# Patient Record
Sex: MALE | Race: BLACK OR AFRICAN AMERICAN
[De-identification: names, ages, dates, MRNs, and addresses within clinical notes are randomized per-mention and may not be internally consistent; named-entity substitution may affect disease eponyms.]

---

## 2017-01-22 ENCOUNTER — HOSPITAL ENCOUNTER (INPATIENT)
Dept: HOSPITAL 17 - NEPA | Age: 67
LOS: 3 days | Discharge: HOME | DRG: 189 | End: 2017-01-25
Attending: HOSPITALIST | Admitting: HOSPITALIST
Payer: COMMERCIAL

## 2017-01-22 VITALS
SYSTOLIC BLOOD PRESSURE: 163 MMHG | DIASTOLIC BLOOD PRESSURE: 88 MMHG | TEMPERATURE: 98.3 F | RESPIRATION RATE: 16 BRPM | HEART RATE: 109 BPM | OXYGEN SATURATION: 98 %

## 2017-01-22 VITALS
DIASTOLIC BLOOD PRESSURE: 89 MMHG | SYSTOLIC BLOOD PRESSURE: 150 MMHG | HEART RATE: 113 BPM | OXYGEN SATURATION: 95 % | RESPIRATION RATE: 21 BRPM

## 2017-01-22 VITALS
DIASTOLIC BLOOD PRESSURE: 83 MMHG | OXYGEN SATURATION: 94 % | TEMPERATURE: 99.4 F | RESPIRATION RATE: 20 BRPM | SYSTOLIC BLOOD PRESSURE: 153 MMHG | HEART RATE: 102 BPM

## 2017-01-22 VITALS — WEIGHT: 160.34 LBS | BODY MASS INDEX: 23.75 KG/M2 | HEIGHT: 69 IN

## 2017-01-22 VITALS
SYSTOLIC BLOOD PRESSURE: 166 MMHG | OXYGEN SATURATION: 94 % | RESPIRATION RATE: 24 BRPM | HEART RATE: 107 BPM | DIASTOLIC BLOOD PRESSURE: 91 MMHG

## 2017-01-22 VITALS
TEMPERATURE: 99.1 F | DIASTOLIC BLOOD PRESSURE: 91 MMHG | OXYGEN SATURATION: 94 % | RESPIRATION RATE: 24 BRPM | HEART RATE: 107 BPM | SYSTOLIC BLOOD PRESSURE: 166 MMHG

## 2017-01-22 VITALS
OXYGEN SATURATION: 94 % | HEART RATE: 107 BPM | DIASTOLIC BLOOD PRESSURE: 84 MMHG | SYSTOLIC BLOOD PRESSURE: 142 MMHG | RESPIRATION RATE: 20 BRPM

## 2017-01-22 VITALS — HEART RATE: 109 BPM

## 2017-01-22 VITALS — OXYGEN SATURATION: 95 %

## 2017-01-22 VITALS — OXYGEN SATURATION: 98 %

## 2017-01-22 DIAGNOSIS — R60.0: ICD-10-CM

## 2017-01-22 DIAGNOSIS — R00.0: ICD-10-CM

## 2017-01-22 DIAGNOSIS — J96.21: Primary | ICD-10-CM

## 2017-01-22 DIAGNOSIS — J44.1: ICD-10-CM

## 2017-01-22 DIAGNOSIS — Z99.81: ICD-10-CM

## 2017-01-22 DIAGNOSIS — J32.9: ICD-10-CM

## 2017-01-22 DIAGNOSIS — Z79.84: ICD-10-CM

## 2017-01-22 DIAGNOSIS — Z23: ICD-10-CM

## 2017-01-22 DIAGNOSIS — Z88.1: ICD-10-CM

## 2017-01-22 DIAGNOSIS — Z85.46: ICD-10-CM

## 2017-01-22 DIAGNOSIS — T38.0X5A: ICD-10-CM

## 2017-01-22 DIAGNOSIS — I10: ICD-10-CM

## 2017-01-22 DIAGNOSIS — E11.65: ICD-10-CM

## 2017-01-22 LAB
ALP SERPL-CCNC: 79 U/L (ref 45–117)
ALT SERPL-CCNC: 19 U/L (ref 12–78)
ANION GAP SERPL CALC-SCNC: 13 MEQ/L (ref 5–15)
APTT BLD: 23 SEC (ref 24.3–30.1)
AST SERPL-CCNC: 8 U/L (ref 15–37)
BASOPHILS # BLD AUTO: 0.1 TH/MM3 (ref 0–0.2)
BASOPHILS NFR BLD: 0.7 % (ref 0–2)
BILIRUB SERPL-MCNC: 0.8 MG/DL (ref 0.2–1)
BUN SERPL-MCNC: 23 MG/DL (ref 7–18)
CHLORIDE SERPL-SCNC: 100 MEQ/L (ref 98–107)
CK MB SERPL-MCNC: 2.6 NG/ML (ref 0.5–3.6)
CK SERPL-CCNC: 173 U/L (ref 39–308)
COLOR UR: (no result)
COMMENT (UR): (no result)
CULTURE IF INDICATED: (no result)
EOSINOPHIL # BLD: 2 TH/MM3 (ref 0–0.4)
EOSINOPHIL NFR BLD: 14.7 % (ref 0–4)
ERYTHROCYTE [DISTWIDTH] IN BLOOD BY AUTOMATED COUNT: 15.1 % (ref 11.6–17.2)
GFR SERPLBLD BASED ON 1.73 SQ M-ARVRAT: 80 ML/MIN (ref 89–?)
GLUCOSE UR STRIP-MCNC: (no result) MG/DL
HCO3 BLD-SCNC: 27.4 MEQ/L (ref 21–32)
HCT VFR BLD CALC: 43.7 % (ref 39–51)
HEMO FLAGS: (no result)
HGB UR QL STRIP: (no result)
INR PPP: 1 RATIO
KETONES UR STRIP-MCNC: (no result) MG/DL
LYMPHOCYTES # BLD AUTO: 0.7 TH/MM3 (ref 1–4.8)
LYMPHOCYTES NFR BLD AUTO: 5.4 % (ref 9–44)
MAGNESIUM SERPL-MCNC: 1.9 MG/DL (ref 1.5–2.5)
MCH RBC QN AUTO: 27.7 PG (ref 27–34)
MCHC RBC AUTO-ENTMCNC: 33.2 % (ref 32–36)
MCV RBC AUTO: 83.4 FL (ref 80–100)
MONOCYTES NFR BLD: 6.6 % (ref 0–8)
MUCOUS THREADS #/AREA URNS LPF: (no result) /LPF
NEUTROPHILS # BLD AUTO: 10 TH/MM3 (ref 1.8–7.7)
NEUTROPHILS NFR BLD AUTO: 72.6 % (ref 16–70)
NITRITE UR QL STRIP: (no result)
PLATELET # BLD: 197 TH/MM3 (ref 150–450)
POTASSIUM SERPL-SCNC: 3.5 MEQ/L (ref 3.5–5.1)
PROTHROMBIN TIME: 10.5 SEC (ref 9.8–11.6)
RBC # BLD AUTO: 5.24 MIL/MM3 (ref 4.5–5.9)
SODIUM SERPL-SCNC: 140 MEQ/L (ref 136–145)
SP GR UR STRIP: 1.01 (ref 1–1.03)
WBC # BLD AUTO: 13.8 TH/MM3 (ref 4–11)

## 2017-01-22 PROCEDURE — 71275 CT ANGIOGRAPHY CHEST: CPT

## 2017-01-22 PROCEDURE — 90732 PPSV23 VACC 2 YRS+ SUBQ/IM: CPT

## 2017-01-22 PROCEDURE — 96365 THER/PROPH/DIAG IV INF INIT: CPT

## 2017-01-22 PROCEDURE — 81001 URINALYSIS AUTO W/SCOPE: CPT

## 2017-01-22 PROCEDURE — 83880 ASSAY OF NATRIURETIC PEPTIDE: CPT

## 2017-01-22 PROCEDURE — 85730 THROMBOPLASTIN TIME PARTIAL: CPT

## 2017-01-22 PROCEDURE — 85025 COMPLETE CBC W/AUTO DIFF WBC: CPT

## 2017-01-22 PROCEDURE — 87040 BLOOD CULTURE FOR BACTERIA: CPT

## 2017-01-22 PROCEDURE — 71010: CPT

## 2017-01-22 PROCEDURE — 82552 ASSAY OF CPK IN BLOOD: CPT

## 2017-01-22 PROCEDURE — 94640 AIRWAY INHALATION TREATMENT: CPT

## 2017-01-22 PROCEDURE — 82948 REAGENT STRIP/BLOOD GLUCOSE: CPT

## 2017-01-22 PROCEDURE — 90686 IIV4 VACC NO PRSV 0.5 ML IM: CPT

## 2017-01-22 PROCEDURE — 85610 PROTHROMBIN TIME: CPT

## 2017-01-22 PROCEDURE — 82550 ASSAY OF CK (CPK): CPT

## 2017-01-22 PROCEDURE — 80053 COMPREHEN METABOLIC PANEL: CPT

## 2017-01-22 PROCEDURE — 93005 ELECTROCARDIOGRAM TRACING: CPT

## 2017-01-22 PROCEDURE — 80048 BASIC METABOLIC PNL TOTAL CA: CPT

## 2017-01-22 PROCEDURE — 94664 DEMO&/EVAL PT USE INHALER: CPT

## 2017-01-22 PROCEDURE — 84484 ASSAY OF TROPONIN QUANT: CPT

## 2017-01-22 PROCEDURE — 83735 ASSAY OF MAGNESIUM: CPT

## 2017-01-22 PROCEDURE — 87804 INFLUENZA ASSAY W/OPTIC: CPT

## 2017-01-22 RX ADMIN — ATORVASTATIN CALCIUM SCH MG: 10 TABLET, FILM COATED ORAL at 21:42

## 2017-01-22 RX ADMIN — BUDESONIDE AND FORMOTEROL FUMARATE DIHYDRATE SCH PUFF: 160; 4.5 AEROSOL RESPIRATORY (INHALATION) at 21:41

## 2017-01-22 RX ADMIN — INSULIN ASPART SCH: 100 INJECTION, SOLUTION INTRAVENOUS; SUBCUTANEOUS at 21:00

## 2017-01-22 RX ADMIN — IPRATROPIUM BROMIDE AND ALBUTEROL SULFATE SCH AMPULE: .5; 3 SOLUTION RESPIRATORY (INHALATION) at 18:00

## 2017-01-22 RX ADMIN — IPRATROPIUM BROMIDE AND ALBUTEROL SULFATE SCH AMPULE: .5; 3 SOLUTION RESPIRATORY (INHALATION) at 20:11

## 2017-01-22 RX ADMIN — IPRATROPIUM BROMIDE AND ALBUTEROL SULFATE SCH AMPULE: .5; 3 SOLUTION RESPIRATORY (INHALATION) at 20:10

## 2017-01-22 RX ADMIN — SODIUM CHLORIDE, PRESERVATIVE FREE SCH ML: 5 INJECTION INTRAVENOUS at 21:05

## 2017-01-22 RX ADMIN — IPRATROPIUM BROMIDE AND ALBUTEROL SULFATE SCH AMPULE: .5; 3 SOLUTION RESPIRATORY (INHALATION) at 17:45

## 2017-01-22 RX ADMIN — GUAIFENESIN SCH MG: 600 TABLET, EXTENDED RELEASE ORAL at 21:19

## 2017-01-22 NOTE — HHI.HP
__________________________________________________





HPI


Service


[facility] [place]


Primary Care Physician


Sadie Nuñez DO


Admission Diagnosis


COPD exacerbation, hypoxia


Diagnoses:  


(1) COPD (chronic obstructive pulmonary disease)


Diagnosis:  Principal





(2) Hypoxia


Diagnosis:  Principal





(3) HTN (hypertension)


Diagnosis:  Principal





(4) DM (diabetes mellitus)


Diagnosis:  Principal





Travel History


International Travel<30 Days:  No


Contact w/Intl Traveler <30 Da:  No


Traveled to Known Affected Are:  No


History of Present Illness


This is a 66-year-old male with a PMH of HTN, DM, COPD, O2 Dependent and 

Prostate CA who was referred to the ER from Urgent Care for hypoxia with O2 sat 

88% on RA.  Pt states he's had ongoing SOB and productive cough w/ white-

colored sputum x3 days, started on Z-pack by PCP, taking meds as prescribed w/ 

minimal relief.  Presented to Urgent Care today for ongoing symptoms and found 

to have hypoxia.  Denies fever or chills, no chest pain.  On arrival, /91

, , O2 sat 94% on RA, Temp 99.1.  WBC 13.8.  Chemistry essentially 

unremarkable.  UA negative.  CXR with no acute findings.  CTA Pulm pending.  S/

p Levaquin and DuoNeb in ER w/ some improvement.





Review of Systems


Other


ROS: 14 point review of systems otherwise negative.





Past Family Social History


Past Medical History


PMH:  HTN, DM, COPD, O2 Dependent and Prostate CA


Past Surgical History


PAST SURGICAL HISTORY:  Sinus Surgery


Allergies:  


Coded Allergies:  


     Clindamycin (Verified  Allergy, Mild, RASH, 1/22/17)


     Tobramycin (Verified  Allergy, Mild, RASH, 1/22/17)


Family History


PAST FAMILY HISTORY:  Reviewed.  No h/o DM or CAD


Social History


PAST SOCIAL HISTORY:  Negative for alcohol, tobacco or drugs.





Physical Exam


Vital Signs





 Vital Signs








  Date Time  Temp Pulse Resp B/P Pulse Ox O2 Delivery O2 Flow Rate FiO2


 


1/22/17 18:09     95 Nasal Cannula 2.00 


 


1/22/17 17:45     98 Nasal Cannula 2 


 


1/22/17 17:45  102 24  94 Room Air  


 


1/22/17 17:45     98 Nasal Cannula 2 


 


1/22/17 17:42 99.1 107 24 166/91 94 Room Air  


 


1/22/17 17:40  107 24 166/91 94 Room Air  


 


1/22/17 17:30 98.3 109 16 163/88 98   








Physical Exam


PE:


GENERAL: Pleasant middle-aged black male in no acute distress.


HEENT: PERRLA, EOMI. No scleral icterus or conjunctival pallor. No lid lag or 

facial droop.  


CARDIOVASCULAR: Regular rate and rhythm.  No obvious murmurs to auscultation. 

No chest tenderness to palpation. 


RESPIRATORY: No obvious rhonchi. Occasional wheezing. Breath sounds equal 

bilaterally. 


GASTROINTESTINAL: Abdomen soft, non-tender, nondistended. BS normal. 


MUSCULOSKELETAL: Extremities without clubbing, cyanosis, or edema. No obvious 

deformities. 


NEUROLOGICAL: Awake, alert and oriented x4. No focal neurologic deficits. 

Moving both upper and lower extremities spontaneously.


Laboratory





Laboratory Tests








Test 1/22/17 1/22/17





 18:00 18:45


 


White Blood Count 13.8  


 


Red Blood Count 5.24  


 


Hemoglobin 14.5  


 


Hematocrit 43.7  


 


Mean Corpuscular Volume 83.4  


 


Mean Corpuscular Hemoglobin 27.7  


 


Mean Corpuscular Hemoglobin 33.2  





Concent  


 


Red Cell Distribution Width 15.1  


 


Platelet Count 197  


 


Mean Platelet Volume 8.7  


 


Neutrophils (%) (Auto) 72.6  


 


Lymphocytes (%) (Auto) 5.4  


 


Monocytes (%) (Auto) 6.6  


 


Eosinophils (%) (Auto) 14.7  


 


Basophils (%) (Auto) 0.7  


 


Neutrophils # (Auto) 10.0  


 


Lymphocytes # (Auto) 0.7  


 


Monocytes # (Auto) 0.9  


 


Eosinophils # (Auto) 2.0  


 


Basophils # (Auto) 0.1  


 


CBC Comment DIFF FINAL  


 


Differential Comment   


 


Prothrombin Time 10.5  


 


Prothromb Time International 1.0  





Ratio  


 


Activated Partial 23.0  





Thromboplast Time  


 


Sodium Level 140  


 


Potassium Level 3.5  


 


Chloride Level 100  


 


Carbon Dioxide Level 27.4  


 


Anion Gap 13  


 


Blood Urea Nitrogen 23  


 


Creatinine 1.11  


 


Estimat Glomerular Filtration 80  





Rate  


 


Random Glucose 122  


 


Calcium Level 9.0  


 


Magnesium Level 1.9  


 


Total Bilirubin 0.8  


 


Aspartate Amino Transf 8  





(AST/SGOT)  


 


Alanine Aminotransferase 19  





(ALT/SGPT)  


 


Alkaline Phosphatase 79  


 


Total Creatine Kinase 173  


 


Creatine Kinase MB 2.6  


 


Troponin I LESS THAN 0.02  


 


B-Type Natriuretic Peptide 24  


 


Total Protein 7.0  


 


Albumin 3.1  


 


Urine Color  LIGHT-YELLOW 


 


Urine Turbidity  CLEAR 


 


Urine pH  6.0 


 


Urine Specific Gravity  1.009 


 


Urine Protein  NEG 


 


Urine Glucose (UA)  NEG 


 


Urine Ketones  NEG 


 


Urine Occult Blood  TRACE 


 


Urine Nitrite  NEG 


 


Urine Bilirubin  NEG 


 


Urine Urobilinogen  LESS THAN 2.0 


 


Urine Leukocyte Esterase  NEG 


 


Urine RBC  1 


 


Urine WBC  1 


 


Urine Mucus  FEW 


 


Microscopic Urinalysis Comment  CULT NOT





  INDICATED














 Date/Time Procedure Status





Source Growth 


 


 1/22/17 18:05 Influenza Types A,B Antigen (PAMELA) - Final Complete





Nasal Washing NEGATIVE FOR FLU A AND B ANTIGEN.... 


 


 1/22/17 18:05 Aerobic Blood Culture Received





Blood Line Pending 





 1/22/17 18:05 Anaerobic Blood Culture Received





Blood Line Pending 








Result Diagram:  


1/22/17 1800                                                                   

             1/22/17 1800








Assessment and Plan


Problem List:  


(1) COPD (chronic obstructive pulmonary disease)


ICD Code:  J44.9


Status:  Acute


(2) Hypoxia


ICD Code:  R09.02


Status:  Acute


(3) HTN (hypertension)


ICD Code:  I10


Status:  Acute


(4) DM (diabetes mellitus)


ICD Code:  E11.9


Status:  Acute


Assessment and Plan


A/P: 


1.  COPD:  Chronic Respiratory Failure w/ Acute Exacerbation.  Referred to ER 

from Urgent Care secondary to hypoxia w/ O2 sat 88% on RA.  S/p Z-pack x3 days 

by PCP w/ no improvement.  +wheezing, +respiratory distress on arrival.  Solu-

Medrol, DuoNeb, Symbicort, Mucinex.  S/p Levaquin in ER, will continue w/ IV 

Abx for possible atypical PNA.  CXR w/ no acute findings, images reviewed by 

me.  CTA Pulm ordered in ER, currently pending.  Will follow up results. 


2.  DM:  Sliding scale w/ Accu-Cheks.  On Metformin and Nateglinide at home. 


3.  HTN:  's, likely compounded by respiratory distress.  Monitor BP.  

Resume home medications. 


4.  DVT Prophylaxis:  SCD/Teds. 


5.  Social work for d/c planning as needed. 


6.  Case discussed w/ ER physician at length.





Physician Certification


2 Midnight Certification Type:  Admission for Inpatient Services


Order for Inpatient Services


The services are ordered in accordance with Medicare regulations or non-

Medicare payer requirements, as applicable.  In the case of services not 

specified as inpatient-only, they are appropriately provided as inpatient 

services in accordance with the 2-midnight benchmark.


Estimated LOS (days):  2


 days is the estimated time the patient will need to remain in the hospital, 

assuming treatment plan goals are met and no additional complications.


Post-Hospital Plan:  Home








Valerie Hendricks MD Jan 22, 2017 20:02

## 2017-01-22 NOTE — PD
HPI


Chief Complaint:  Respiratory Symptoms


Time Seen by Provider:  17:45


Travel History


International Travel<30 days:  No


Contact w/Intl Traveler<30days:  No


Traveled to known affect area:  No





History of Present Illness


HPI


66-year-old male with history of COPD and chronic sinusitis presents the ED for 

evaluation of 3 day history of increased shortness of breath.  Patient endorses 

a tightness in the central area of the chest has been constant over the same 

period time. Denies radiation of the pain, diaphoresis, nausea or vomiting.  

Endorses chronic cough, occasionally productive of white sputum.  He also 

endorses chronic rhinorrhea with occasional bright green output.  He saw his 

primary care provider, Dr. Nuñez who prescribed azithromycin, currently on day 

3.  Went to [facility] today where his O2 sats were 88% on room air.  

Subsequently referred here.





PFSH


Past Medical History


Cancer:  Yes (PROSTATE)


Cardiovascular Problems:  Yes


High Cholesterol:  Yes


COPD:  Yes (EMPHYSEMA COPD)


Cerebrovascular Accident:  No


Diabetes:  Yes (TYPE 2 )


Diminished Hearing:  No


Genitourinary:  Yes


Hypertension:  Yes


Immune Disorder:  No


Musculoskeletal:  Yes


Neurologic:  No


Psychiatric:  No


Reproductive:  No


Respiratory:  Yes (COPD)


Immunizations Current:  Yes


Migraines:  No





Past Surgical History


Other Surgery:  Yes (4 SINUS SURGERIES - 2000 - 2007)





Social History


Alcohol Use:  No


Tobacco Use:  No


Substance Use:  No





Allergies-Medications


(Allergen,Severity, Reaction):  


Coded Allergies:  


     Clindamycin (Verified  Allergy, Mild, RASH, 1/22/17)


     Tobramycin (Verified  Allergy, Mild, RASH, 1/22/17)


Reported Meds & Prescriptions





Reported Meds & Active Scripts


Active


Reported


Furosemide 20 Mg Tab 20 Mg PO DAILY


Amoxicillin 500 Mg Cap 500 Mg PO BID


Caltrate 600+D (Calcium Carbonate-Cholecalciferol) 600-800 Mg-Unit Tab 1 Tab PO 

BID


Spiriva Handihaler (Tiotropium Inh) 18 Mcg Cap 18 Mcg INH DAILY


     1 capsule = 18 mcg


Atorvastatin (Atorvastatin Calcium) 10 Mg Tab 10 Mg PO HS


Metformin ER (Metformin HCl) 500 Mg Fredi 500 Mg PO TID


     With evening meal


Nateglinide 120 Mg Tab 120 Mg PO TIDAC


Albuterol Neb (Albuterol Sulfate) 1.25 Mg/3 Ml Neb 1.25 Mg NEB TID NEB PRN


Prednisone 10 Mg Tab 10 Mg PO DAILY








Review of Systems


Except as stated in HPI:  all other systems reviewed are Neg





Physical Exam


Narrative


GENERAL: Well-nourished, well-developed thin black male in no acute distress.


SKIN: Warm and dry.


HEAD: Normocephalic.


EYES: No scleral icterus. No injection or drainage. 


NECK: Supple, trachea midline. No JVD or lymphadenopathy.


CARDIOVASCULAR: Regular rate and rhythm without murmurs, gallops, or rubs.  2+ 

DP and radial pulses bilaterally.


RESPIRATORY: Breath sounds equal bilaterally.  Diffuse wheezes in all lung 

fields.  ++ accessory muscle use.  Speaking in short sentences.


GASTROINTESTINAL: Abdomen soft, non-tender, nondistended.  Active bowel sounds.


MUSCULOSKELETAL: No cyanosis.  2+ pitting edema in bilateral lower extremities, 

left greater than right.  Homans sign negative bilaterally.


BACK: Nontender without obvious deformity. No CVA tenderness.





Data


Data


Last Documented VS





Vital Signs








  Date Time  Temp Pulse Resp B/P Pulse Ox O2 Delivery O2 Flow Rate FiO2


 


1/22/17 18:09     95 Nasal Cannula 2.00 


 


1/22/17 17:45  102 24     


 


1/22/17 17:42 99.1   166/91    








Orders





 Complete Blood Count With Diff (1/22/17 17:43)


Comprehensive Metabolic Panel (1/22/17 17:43)


B-Type Natriuretic Peptide (1/22/17 17:43)


Act Partial Throm Time (Ptt) (1/22/17 17:43)


Prothrombin Time / Inr (Pt) (1/22/17 17:43)


Magnesium (Mg) (1/22/17 17:43)


Ckmb (Isoenzyme) Profile (1/22/17 17:43)


Troponin I (1/22/17 17:43)


Urinalysis - C+S If Indicated (1/22/17 17:43)


Influenzae A/B Antigen (1/22/17 17:43)


Iv Access Insert/Monitor (1/22/17 17:43)


Electrocardiogram (1/22/17 17:43)


Ecg Monitoring (1/22/17 17:43)


Oximetry (1/22/17 17:43)


Oxygen Administration (1/22/17 17:43)


Chest, Single Ap (1/22/17 17:43)


Sodium Chloride 0.9% Flush (Ns Flush) (1/22/17 17:45)


Albuterol-Ipratropium Neb (Duoneb Neb) (1/22/17 17:45)


Blood Glucose (1/22/17 17:43)


Blood Culture (1/22/17 17:45)


Levofloxacin 750 Mg Premix Inj (Levaquin (1/22/17 18:00)


CKMB (1/22/17 18:00)


CKMB% (1/22/17 18:00)


Ct Pulmonary Angiogram (1/22/17 19:40)


Admit Order (Ed Use Only) (1/22/17 19:51)





Labs





 Laboratory Tests








Test 1/22/17 1/22/17





 18:00 18:45


 


White Blood Count 13.8 TH/MM3 


 


Red Blood Count 5.24 MIL/MM3 


 


Hemoglobin 14.5 GM/DL 


 


Hematocrit 43.7 % 


 


Mean Corpuscular Volume 83.4 FL 


 


Mean Corpuscular Hemoglobin 27.7 PG 


 


Mean Corpuscular Hemoglobin 33.2 % 





Concent  


 


Red Cell Distribution Width 15.1 % 


 


Platelet Count 197 TH/MM3 


 


Mean Platelet Volume 8.7 FL 


 


Neutrophils (%) (Auto) 72.6 % 


 


Lymphocytes (%) (Auto) 5.4 % 


 


Monocytes (%) (Auto) 6.6 % 


 


Eosinophils (%) (Auto) 14.7 % 


 


Basophils (%) (Auto) 0.7 % 


 


Neutrophils # (Auto) 10.0 TH/MM3 


 


Lymphocytes # (Auto) 0.7 TH/MM3 


 


Monocytes # (Auto) 0.9 TH/MM3 


 


Eosinophils # (Auto) 2.0 TH/MM3 


 


Basophils # (Auto) 0.1 TH/MM3 


 


CBC Comment DIFF FINAL  


 


Differential Comment   


 


Prothrombin Time 10.5 SEC 


 


Prothromb Time International 1.0 RATIO 





Ratio  


 


Activated Partial 23.0 SEC 





Thromboplast Time  


 


Sodium Level 140 MEQ/L 


 


Potassium Level 3.5 MEQ/L 


 


Chloride Level 100 MEQ/L 


 


Carbon Dioxide Level 27.4 MEQ/L 


 


Anion Gap 13 MEQ/L 


 


Blood Urea Nitrogen 23 MG/DL 


 


Creatinine 1.11 MG/DL 


 


Estimat Glomerular Filtration 80 ML/MIN 





Rate  


 


Random Glucose 122 MG/DL 


 


Calcium Level 9.0 MG/DL 


 


Magnesium Level 1.9 MG/DL 


 


Total Bilirubin 0.8 MG/DL 


 


Aspartate Amino Transf 8 U/L 





(AST/SGOT)  


 


Alanine Aminotransferase 19 U/L 





(ALT/SGPT)  


 


Alkaline Phosphatase 79 U/L 


 


Total Creatine Kinase 173 U/L 


 


Creatine Kinase MB 2.6 NG/ML 


 


Troponin I LESS THAN 0.02 





 NG/ML 


 


B-Type Natriuretic Peptide 24 PG/ML 


 


Total Protein 7.0 GM/DL 


 


Albumin 3.1 GM/DL 


 


Urine Color  LIGHT-YELLOW 


 


Urine Turbidity  CLEAR 


 


Urine pH  6.0 


 


Urine Specific Gravity  1.009 


 


Urine Protein  NEG mg/dL


 


Urine Glucose (UA)  NEG mg/dL


 


Urine Ketones  NEG mg/dL


 


Urine Occult Blood  TRACE 


 


Urine Nitrite  NEG 


 


Urine Bilirubin  NEG 


 


Urine Urobilinogen  LESS THAN 2.0





  MG/DL


 


Urine Leukocyte Esterase  NEG 


 


Urine RBC  1 /hpf


 


Urine WBC  1 /hpf


 


Urine Mucus  FEW /lpf


 


Microscopic Urinalysis Comment  CULT NOT





  INDICATED











MDM


Medical Decision Making


Medical Screen Exam Complete:  Yes


Emergency Medical Condition:  Yes


Interpretation(s)


Rate 102, sinus rhythm.  MT interval 126 ms.  QRS 94, .  Normal axis.  

No ischemic changes.  Reviewed by Dr. Stockton.


Differential Diagnosis


Influenza versus viral illness versus pneumonia versus COPD exacerbation versus 

PE


Narrative Course


66-year-old male with history of COPD and chronic sinusitis presents the ED for 

evaluation of 3 day history of increased shortness of breath and tightness of 

the chest. Denies radiation of the pain, diaphoresis, nausea or vomiting.  

Endorses chronic cough, occasionally productive of white sputum and chronic 

rhinorrhea with occasional bright green output.  He saw his primary care 

provider, Dr. Nuñez who prescribed azithromycin, currently on day 3.  On 2 L 

O2 as needed at home.  O2 sats 83% at [facility] today, referred here.  Vitals 

reviewed.  Sats the low 90s on room air on presentation.  Respiratory rate 24.  

Tachycardic rate 107.  Patient is to, speaking in short sentences, positive 

accessory muscle use.  Auscultation reveals diffuse wheezing in the lung 

fields.  Edema bilateral lower extremities, left greater than right.  Homans 

sign negative bilaterally.  Placed on 2 L nasal cannula, IV was established.  

Blood cultures were obtained and are pending.  Patient was administered duo 

nebs 3 and Levaquin.





CBC: WBC 13.8.  Hemoglobin 14.5.


INR 1.0


CBC: BUN 23, creatinine 1.11


Magnesium 1.9.


BNP 24.


Cardiac enzymes: Negative


Chest x-ray: No acute cardiopulmonary process.


EKG: As above, no ischemic changes.


CTA: Pending.





Recheck of the patient reveals O2 sats improved to 98% on 2 L nasal cannula.  

Subjective improvement and breathing without much improvement of the lung 

sounds.  Sats fall to 88% sans supplementary O2.  Discussed the patient, workup 

and plan of care with Dr. Stockton who is agreeable with admission.  We'll admit 

for COPD exacerbation.  Spoke with Dr. Hendricks, medical service, who agrees to 

accept this patient.  Please see her note for disposition.





Diagnosis





 Primary Impression:  


 COPD exacerbation


 Additional Impression:  


 Hypoxia








Dawna Elliott Jan 22, 2017 17:45

## 2017-01-22 NOTE — PD
Data


Data


Last Documented VS





Vital Signs








  Date Time  Temp Pulse Resp B/P Pulse Ox O2 Delivery O2 Flow Rate FiO2


 


1/22/17 19:00  107 20  94 Nasal Cannula 3 


 


1/22/17 19:00    142/84    


 


1/22/17 17:42 99.1       








Orders





 Complete Blood Count With Diff (1/22/17 17:43)


Comprehensive Metabolic Panel (1/22/17 17:43)


B-Type Natriuretic Peptide (1/22/17 17:43)


Act Partial Throm Time (Ptt) (1/22/17 17:43)


Prothrombin Time / Inr (Pt) (1/22/17 17:43)


Magnesium (Mg) (1/22/17 17:43)


Ckmb (Isoenzyme) Profile (1/22/17 17:43)


Troponin I (1/22/17 17:43)


Urinalysis - C+S If Indicated (1/22/17 17:43)


Influenzae A/B Antigen (1/22/17 17:43)


Iv Access Insert/Monitor (1/22/17 17:43)


Electrocardiogram (1/22/17 17:43)


Ecg Monitoring (1/22/17 17:43)


Oximetry (1/22/17 17:43)


Oxygen Administration (1/22/17 17:43)


Chest, Single Ap (1/22/17 17:43)


Sodium Chloride 0.9% Flush (Ns Flush) (1/22/17 17:45)


Albuterol-Ipratropium Neb (Duoneb Neb) (1/22/17 17:45)


Blood Glucose (1/22/17 17:43)


Blood Culture (1/22/17 17:45)


Levofloxacin 750 Mg Premix Inj (Levaquin (1/22/17 18:00)


CKMB (1/22/17 18:00)


CKMB% (1/22/17 18:00)


Ct Pulmonary Angiogram (1/22/17 19:40)


Admit Order (Ed Use Only) (1/22/17 19:51)





Labs





 Laboratory Tests








Test 1/22/17 1/22/17





 18:00 18:45


 


White Blood Count 13.8 TH/MM3 


 


Red Blood Count 5.24 MIL/MM3 


 


Hemoglobin 14.5 GM/DL 


 


Hematocrit 43.7 % 


 


Mean Corpuscular Volume 83.4 FL 


 


Mean Corpuscular Hemoglobin 27.7 PG 


 


Mean Corpuscular Hemoglobin 33.2 % 





Concent  


 


Red Cell Distribution Width 15.1 % 


 


Platelet Count 197 TH/MM3 


 


Mean Platelet Volume 8.7 FL 


 


Neutrophils (%) (Auto) 72.6 % 


 


Lymphocytes (%) (Auto) 5.4 % 


 


Monocytes (%) (Auto) 6.6 % 


 


Eosinophils (%) (Auto) 14.7 % 


 


Basophils (%) (Auto) 0.7 % 


 


Neutrophils # (Auto) 10.0 TH/MM3 


 


Lymphocytes # (Auto) 0.7 TH/MM3 


 


Monocytes # (Auto) 0.9 TH/MM3 


 


Eosinophils # (Auto) 2.0 TH/MM3 


 


Basophils # (Auto) 0.1 TH/MM3 


 


CBC Comment DIFF FINAL  


 


Differential Comment   


 


Prothrombin Time 10.5 SEC 


 


Prothromb Time International 1.0 RATIO 





Ratio  


 


Activated Partial 23.0 SEC 





Thromboplast Time  


 


Sodium Level 140 MEQ/L 


 


Potassium Level 3.5 MEQ/L 


 


Chloride Level 100 MEQ/L 


 


Carbon Dioxide Level 27.4 MEQ/L 


 


Anion Gap 13 MEQ/L 


 


Blood Urea Nitrogen 23 MG/DL 


 


Creatinine 1.11 MG/DL 


 


Estimat Glomerular Filtration 80 ML/MIN 





Rate  


 


Random Glucose 122 MG/DL 


 


Calcium Level 9.0 MG/DL 


 


Magnesium Level 1.9 MG/DL 


 


Total Bilirubin 0.8 MG/DL 


 


Aspartate Amino Transf 8 U/L 





(AST/SGOT)  


 


Alanine Aminotransferase 19 U/L 





(ALT/SGPT)  


 


Alkaline Phosphatase 79 U/L 


 


Total Creatine Kinase 173 U/L 


 


Creatine Kinase MB 2.6 NG/ML 


 


Troponin I LESS THAN 0.02 





 NG/ML 


 


B-Type Natriuretic Peptide 24 PG/ML 


 


Total Protein 7.0 GM/DL 


 


Albumin 3.1 GM/DL 


 


Urine Color  LIGHT-YELLOW 


 


Urine Turbidity  CLEAR 


 


Urine pH  6.0 


 


Urine Specific Gravity  1.009 


 


Urine Protein  NEG mg/dL


 


Urine Glucose (UA)  NEG mg/dL


 


Urine Ketones  NEG mg/dL


 


Urine Occult Blood  TRACE 


 


Urine Nitrite  NEG 


 


Urine Bilirubin  NEG 


 


Urine Urobilinogen  LESS THAN 2.0





  MG/DL


 


Urine Leukocyte Esterase  NEG 


 


Urine RBC  1 /hpf


 


Urine WBC  1 /hpf


 


Urine Mucus  FEW /lpf


 


Microscopic Urinalysis Comment  CULT NOT





  INDICATED











MDM


Supervised Visit with [facility]:  Yes


Narrative Course


I, Dr. Stockton, have reviewed the advance practice practitioner's documentation 

and am in agreement, met with the patient face to face, made the diagnosis, and 

the medical decision making was done by me.  





*My assessment and Findings: Patient is 66 year old male presents tachycardic 

and tachypneic.  COPD exacerbation obvious suspecting underlying PNA.  CXR 

negative.  Significantly tachycardic to high 120's allbeit after nebulizer 

treatment with albuterol.  PE is to be considered.  Scan negative.  SOB is 

improving but still would benefit from inpatient observation.


Scripts


Hydrocodone-Acetaminophen 5-325 mg Tab1 Tab PO Q6HR PRN (pain) #28 TAB


   Prov:Billy Stein MD         1/24/17 


Fluticasone Nasal Spray 50 Mcg/Act Naspr2 [place] NASAL DAILY  #1 BOTTLE


   Prov:Billy Stein MD         1/24/17








Kentrell Stockton MD Jan 22, 2017 18:28

## 2017-01-22 NOTE — RADRPT
EXAM DATE/TIME:  01/22/2017 18:05 

 

[facility] COMPARISON:     

No previous studies available for comparison.

 

                     

INDICATIONS :     

Shortness of breath.

                     

 

MEDICAL HISTORY :     

Chronic obstructive pulmonary disease.          

 

SURGICAL HISTORY :     

None.   

 

ENCOUNTER:     

Initial                                        

 

ACUITY:     

2 weeks      

 

PAIN SCORE:     

0/10

 

LOCATION:     

Bilateral chest 

 

FINDINGS:     

A single view of the chest demonstrates the lungs to be symmetrically aerated without evidence of mas
s, infiltrate or effusion.  The cardiomediastinal contours are unremarkable. There appears to be some
 calcification of the mitral valve annulus. S-shaped scoliosis of the thoracolumbar spine. Otherwise 
intact.

 

CONCLUSION:     

No acute cardiopulmonary process. Apparent calcification of the mitral valve annulus.

 

 

 

 Jose Chappell MD on January 22, 2017 at 18:35           

Board Certified Radiologist.

 This report was verified electronically.

## 2017-01-22 NOTE — RADRPT
EXAM DATE/TIME:  01/22/2017 20:48 

 

[facility] COMPARISON:     No previous studies available for comparison.

 

INDICATIONS :     Shortness of breath and chest tightness.

                      

IV CONTRAST:     75 cc Omnipaque 350 (iohexol) IV 

 

RADIATION DOSE:     23.36 CTDIvol (mGy) 

 

MEDICAL HISTORY :     Cardiovascular disease. Carcinoma, prostate. Chronic obstructive pulmonary dise
ase.

SURGICAL HISTORY :      None. 

ENCOUNTER:      Initial

ACUITY:      1 day

PAIN SCALE:      5/10

LOCATION:       Bilateral chest 

 

TECHNIQUE:     Volumetric scanning of the chest was performed using a pulmonary embolism protocol MIP
 images were reconstructed.  Using automated exposure control and adjustment of the mA and/or kV acco
rding to patient size, radiation dose was kept as low as reasonably achievable to obtain optimal diag
nostic quality images. 

 

FINDINGS:     

No pulmonary embolus is identified.  There is some increased density seen in the posteromedial left l
ower lobe.  There appears to be some possible bronchiectasis in this region.  There are prominent lym
ph nodes in the AP window, left tracheobronchial and subcarinal regions.  There is a mildly prominent
 lymph node seen in the left hilum.  Visualized structures in the upper abdomen are unremarkable.  Th
ere are some coronary artery calcifications present. 

 

CONCLUSION:     

1.  No pulmonary embolus.

2.  Increased density in the posteromedial left lower lobe with possible bronchiectasis.  There is al
so some adenopathy in the left side of the mediastinum in the AP window, left tracheobronchial region
, left hilum and the subcarinal regions.

 

 Clinton Hernandez MD on January 22, 2017 at 20:59                

Board Certified Radiologist.

 This report was verified electronically.

## 2017-01-23 VITALS — HEART RATE: 90 BPM

## 2017-01-23 VITALS
OXYGEN SATURATION: 94 % | RESPIRATION RATE: 18 BRPM | SYSTOLIC BLOOD PRESSURE: 143 MMHG | DIASTOLIC BLOOD PRESSURE: 90 MMHG | TEMPERATURE: 96 F | HEART RATE: 89 BPM

## 2017-01-23 VITALS
RESPIRATION RATE: 20 BRPM | SYSTOLIC BLOOD PRESSURE: 162 MMHG | OXYGEN SATURATION: 95 % | TEMPERATURE: 98.4 F | HEART RATE: 100 BPM | DIASTOLIC BLOOD PRESSURE: 87 MMHG

## 2017-01-23 VITALS
SYSTOLIC BLOOD PRESSURE: 142 MMHG | DIASTOLIC BLOOD PRESSURE: 77 MMHG | RESPIRATION RATE: 20 BRPM | OXYGEN SATURATION: 95 % | HEART RATE: 96 BPM | TEMPERATURE: 96.8 F

## 2017-01-23 VITALS
SYSTOLIC BLOOD PRESSURE: 56 MMHG | DIASTOLIC BLOOD PRESSURE: 80 MMHG | OXYGEN SATURATION: 95 % | TEMPERATURE: 98.6 F | RESPIRATION RATE: 20 BRPM | HEART RATE: 99 BPM

## 2017-01-23 VITALS
RESPIRATION RATE: 20 BRPM | SYSTOLIC BLOOD PRESSURE: 158 MMHG | TEMPERATURE: 98.6 F | DIASTOLIC BLOOD PRESSURE: 81 MMHG | OXYGEN SATURATION: 96 % | HEART RATE: 92 BPM

## 2017-01-23 VITALS
DIASTOLIC BLOOD PRESSURE: 90 MMHG | OXYGEN SATURATION: 96 % | RESPIRATION RATE: 18 BRPM | SYSTOLIC BLOOD PRESSURE: 151 MMHG | TEMPERATURE: 96.3 F | HEART RATE: 83 BPM

## 2017-01-23 VITALS
HEART RATE: 90 BPM | RESPIRATION RATE: 18 BRPM | TEMPERATURE: 96.5 F | SYSTOLIC BLOOD PRESSURE: 143 MMHG | OXYGEN SATURATION: 92 % | DIASTOLIC BLOOD PRESSURE: 87 MMHG

## 2017-01-23 VITALS — OXYGEN SATURATION: 94 %

## 2017-01-23 VITALS — OXYGEN SATURATION: 95 %

## 2017-01-23 VITALS — HEART RATE: 92 BPM

## 2017-01-23 LAB
ALP SERPL-CCNC: 82 U/L (ref 45–117)
ALT SERPL-CCNC: 16 U/L (ref 12–78)
ANION GAP SERPL CALC-SCNC: 10 MEQ/L (ref 5–15)
AST SERPL-CCNC: 9 U/L (ref 15–37)
BASOPHILS # BLD AUTO: 0 TH/MM3 (ref 0–0.2)
BASOPHILS NFR BLD: 0.3 % (ref 0–2)
BILIRUB SERPL-MCNC: 1.3 MG/DL (ref 0.2–1)
BUN SERPL-MCNC: 17 MG/DL (ref 7–18)
CHLORIDE SERPL-SCNC: 99 MEQ/L (ref 98–107)
EOSINOPHIL # BLD: 0.5 TH/MM3 (ref 0–0.4)
EOSINOPHIL NFR BLD: 4.7 % (ref 0–4)
ERYTHROCYTE [DISTWIDTH] IN BLOOD BY AUTOMATED COUNT: 15.1 % (ref 11.6–17.2)
GFR SERPLBLD BASED ON 1.73 SQ M-ARVRAT: 84 ML/MIN (ref 89–?)
HCO3 BLD-SCNC: 27.1 MEQ/L (ref 21–32)
HCT VFR BLD CALC: 40.6 % (ref 39–51)
HEMO FLAGS: (no result)
LYMPHOCYTES # BLD AUTO: 0.5 TH/MM3 (ref 1–4.8)
LYMPHOCYTES NFR BLD AUTO: 4.4 % (ref 9–44)
MCH RBC QN AUTO: 27.9 PG (ref 27–34)
MCHC RBC AUTO-ENTMCNC: 33.6 % (ref 32–36)
MCV RBC AUTO: 83.1 FL (ref 80–100)
MONOCYTES NFR BLD: 1.9 % (ref 0–8)
NEUTROPHILS # BLD AUTO: 10.1 TH/MM3 (ref 1.8–7.7)
NEUTROPHILS NFR BLD AUTO: 88.7 % (ref 16–70)
PLATELET # BLD: 171 TH/MM3 (ref 150–450)
POTASSIUM SERPL-SCNC: 3.9 MEQ/L (ref 3.5–5.1)
RBC # BLD AUTO: 4.89 MIL/MM3 (ref 4.5–5.9)
SODIUM SERPL-SCNC: 136 MEQ/L (ref 136–145)
WBC # BLD AUTO: 11.3 TH/MM3 (ref 4–11)

## 2017-01-23 RX ADMIN — BUDESONIDE AND FORMOTEROL FUMARATE DIHYDRATE SCH PUFF: 160; 4.5 AEROSOL RESPIRATORY (INHALATION) at 09:58

## 2017-01-23 RX ADMIN — BUDESONIDE AND FORMOTEROL FUMARATE DIHYDRATE SCH PUFF: 160; 4.5 AEROSOL RESPIRATORY (INHALATION) at 22:14

## 2017-01-23 RX ADMIN — GUAIFENESIN SCH MG: 600 TABLET, EXTENDED RELEASE ORAL at 22:14

## 2017-01-23 RX ADMIN — METHYLPREDNISOLONE SODIUM SUCCINATE SCH MG: 40 INJECTION, POWDER, FOR SOLUTION INTRAMUSCULAR; INTRAVENOUS at 06:05

## 2017-01-23 RX ADMIN — INSULIN ASPART SCH: 100 INJECTION, SOLUTION INTRAVENOUS; SUBCUTANEOUS at 15:25

## 2017-01-23 RX ADMIN — METHYLPREDNISOLONE SODIUM SUCCINATE SCH MG: 40 INJECTION, POWDER, FOR SOLUTION INTRAMUSCULAR; INTRAVENOUS at 22:15

## 2017-01-23 RX ADMIN — LORATADINE SCH MG: 10 TABLET ORAL at 09:59

## 2017-01-23 RX ADMIN — INSULIN ASPART SCH: 100 INJECTION, SOLUTION INTRAVENOUS; SUBCUTANEOUS at 22:15

## 2017-01-23 RX ADMIN — GUAIFENESIN SCH MG: 600 TABLET, EXTENDED RELEASE ORAL at 09:59

## 2017-01-23 RX ADMIN — ATORVASTATIN CALCIUM SCH MG: 10 TABLET, FILM COATED ORAL at 22:14

## 2017-01-23 RX ADMIN — INSULIN ASPART SCH: 100 INJECTION, SOLUTION INTRAVENOUS; SUBCUTANEOUS at 06:49

## 2017-01-23 RX ADMIN — TIOTROPIUM BROMIDE SCH MCG: 18 CAPSULE ORAL; RESPIRATORY (INHALATION) at 09:57

## 2017-01-23 RX ADMIN — FUROSEMIDE SCH MG: 20 TABLET ORAL at 09:59

## 2017-01-23 RX ADMIN — METHYLPREDNISOLONE SODIUM SUCCINATE SCH MG: 40 INJECTION, POWDER, FOR SOLUTION INTRAMUSCULAR; INTRAVENOUS at 12:59

## 2017-01-23 RX ADMIN — SODIUM CHLORIDE, PRESERVATIVE FREE SCH ML: 5 INJECTION INTRAVENOUS at 09:57

## 2017-01-23 RX ADMIN — SODIUM CHLORIDE, PRESERVATIVE FREE SCH ML: 5 INJECTION INTRAVENOUS at 20:18

## 2017-01-23 RX ADMIN — IPRATROPIUM BROMIDE AND ALBUTEROL SULFATE SCH AMPULE: .5; 3 SOLUTION RESPIRATORY (INHALATION) at 07:50

## 2017-01-23 RX ADMIN — IPRATROPIUM BROMIDE AND ALBUTEROL SULFATE SCH AMPULE: .5; 3 SOLUTION RESPIRATORY (INHALATION) at 14:02

## 2017-01-23 RX ADMIN — METHYLPREDNISOLONE SODIUM SUCCINATE SCH MG: 40 INJECTION, POWDER, FOR SOLUTION INTRAMUSCULAR; INTRAVENOUS at 00:30

## 2017-01-23 RX ADMIN — IPRATROPIUM BROMIDE AND ALBUTEROL SULFATE SCH AMPULE: .5; 3 SOLUTION RESPIRATORY (INHALATION) at 19:57

## 2017-01-23 RX ADMIN — FLUTICASONE PROPIONATE SCH SPRAY: 50 SPRAY, METERED NASAL at 10:00

## 2017-01-23 NOTE — HHI.PR
Subjective


Remarks


Follow-up for COPD exacerbation.  The patient reports his shortness of breath 

has improved some.  He is on home oxygen just as needed.  He states that 

shortness of breath has been worse with exertion.  He hasn't tried getting out 

of bed and ambulating yet.  He is having a cough with white sputum.  He has 

chronic sinus problems and a sinus headache, unchanged, prescribed fluticasone 

last week.





Objective


Vitals





 Vital Signs








  Date Time  Temp Pulse Resp B/P Pulse Ox O2 Delivery O2 Flow Rate FiO2


 


1/23/17 07:53 96.0 89 18 143/90 94   


 


1/23/17 07:50     94 Nasal Cannula 3.00 


 


1/23/17 06:05   18     


 


1/23/17 05:04 96.8 96 20 142/77 95   


 


1/23/17 02:12 98.4 100 20 162/87 95   


 


1/23/17 01:45   20     


 


1/22/17 23:23  109      


 


1/22/17 21:57 99.4 102 20 153/83 94   


 


1/22/17 21:00  113 21 150/89 95 Nasal Cannula 3 


 


1/22/17 19:00  107 20  94 Nasal Cannula 3 


 


1/22/17 19:00  107 20 142/84 94 Nasal Cannula 3 


 


1/22/17 18:09     95 Nasal Cannula 2.00 


 


1/22/17 17:45     98 Nasal Cannula 2 


 


1/22/17 17:45  102 24  94 Room Air  


 


1/22/17 17:45     98 Nasal Cannula 2 


 


1/22/17 17:42 99.1 107 24 166/91 94 Room Air  


 


1/22/17 17:40  107 24 166/91 94 Room Air  


 


1/22/17 17:30 98.3 109 16 163/88 98   








 I/O








 1/22/17 1/22/17 1/22/17 1/23/17 1/23/17 1/23/17





 07:00 15:00 23:00 07:00 15:00 23:00


 


Output Total    800 ml  


 


Balance    -800 ml  


 


      


 


Output Urine Total    800 ml  








Result Diagram:  


1/23/17 0516                                                                   

             1/23/17 0516





Imaging





Last Impressions








CT Angiography 1/22/17 1940 Signed





Impressions: 





 Service Date/Time:  Sunday, January 22, 2017 20:48 - CONCLUSION:  1.  No 





 pulmonary embolus. 2.  Increased density in the posteromedial left lower lobe 





 with possible bronchiectasis.  There is also some adenopathy in the left side 

of 





 the mediastinum in the AP window, left tracheobronchial region, left hilum and 





 the subcarinal regions.   Clinton Hernandez MD 


 


Chest X-Ray 1/22/17 4204 Signed





Impressions: 





 Service Date/Time:  Sunday, January 22, 2017 18:05 - CONCLUSION:  No acute 





 cardiopulmonary process. Apparent calcification of the mitral valve annulus.  

   





 Jose Chappell MD 








Objective Remarks


GENERAL: Well-developed well-nourished.  In no acute distress.


SKIN: Warm and dry. No lesions noted.


HEENT: Normocephalic. Pupils equal and round. Mucous membranes pink and moist.  

No sinus TTP.


CARDIOVASCULAR: Regular rate and rhythm.  No murmur appreciated.


RESPIRATORY: No accessory muscle use. Clear to auscultation.  Decreased, 

diminished breath sounds.  No wheezing.


GASTROINTESTINAL: Abdomen soft, non-tender, nondistended. Bowel sounds x4.


MUSCULOSKELETAL: No obvious deformities. No clubbing or cyanosis. No edema. 


NEUROLOGICAL: Awake and alert. No focal neurological deficits.  Moves upper and 

lower extremities spontaneously. Normal speech.


PSYCHIATRIC: Appropriate mood and affect; insight and judgment normal.





A/P


Problem List:  


(1) COPD (chronic obstructive pulmonary disease)


ICD Code:  J44.9


Status:  Acute


(2) Hypoxia


ICD Code:  R09.02


Status:  Acute


(3) HTN (hypertension)


ICD Code:  I10


Status:  Chronic


(4) DM (diabetes mellitus)


ICD Code:  E11.9


Status:  Chronic


Assessment and Plan


66-year-old male with a PMH of HTN, DM, COPD, O2 Dependent and Prostate CA who 

was referred to the ER from Urgent Care for hypoxia with O2 sat 88% on RA





COPD:  Chronic Respiratory Failure on home O2 as needed, now w/ Acute 

Exacerbation. S/p Z-pack x3 days by PCP w/ no improvement.  Improving.  

Continue IV steroids, taper.  DuoNebs scheduled and as needed. Symbicort, 

Spiriva, Mucinex.  Continue IV Levaquin.  CXR w/ no acute findings.  CTA Pulm 

ordered in the ED; no PE and left lower lobe bronchiectasis, left mediastinal 

adenopathy.  Outpatient follow-up for DC.  





Sinusitis: Chronic.  Continue fluticasone.  Start Claritin.





DM:  Sliding scale w/ Accu-Cheks.  On Metformin and Nateglinide at home. 





HTN: Not optimally controlled, likely compounded by respiratory distress.  

Continue home furosemide.





DVT Prophylaxis:  SCD/Teds. 





Written by Dave Fisher, acting as scribe for Dr. Stein on 1/23/17 at 09:35. 


The documentation accurately reflects the work performed face-to-face by me on 1 /23/17 at  0935


Discharge Planning


Disposition pending continued clinical improvement





Problem Qualifiers





(1) COPD (chronic obstructive pulmonary disease):  


Qualified Code:  J44.1 - Chronic obstructive pulmonary disease with acute 

exacerbation


(2) HTN (hypertension):  


Qualified Code:  I10 - Essential hypertension


(3) DM (diabetes mellitus):  





Dave Fisher Jan 23, 2017 09:35


Billy Stein MD Jan 23, 2017 11:28

## 2017-01-23 NOTE — EKG
Date Performed: 01/22/2017       Time Performed: 17:54:19

 

PTAGE:      66 years

 

EKG:      SINUS TACHYCARDIA POSSIBLE LEFT ATRIAL ENLARGEMENT Compared to prior tracing no significant
 change ABNORMAL RHYTHM ECG

 

PREVIOUS TRACING       : 03/17/2014 09.36

 

DOCTOR:   Sabino Friedman  Interpretating Date/Time  01/23/2017 17:05:33

## 2017-01-24 VITALS
HEART RATE: 91 BPM | RESPIRATION RATE: 18 BRPM | SYSTOLIC BLOOD PRESSURE: 141 MMHG | TEMPERATURE: 97.9 F | OXYGEN SATURATION: 96 % | DIASTOLIC BLOOD PRESSURE: 77 MMHG

## 2017-01-24 VITALS
RESPIRATION RATE: 20 BRPM | SYSTOLIC BLOOD PRESSURE: 134 MMHG | TEMPERATURE: 98.5 F | OXYGEN SATURATION: 96 % | DIASTOLIC BLOOD PRESSURE: 79 MMHG | HEART RATE: 96 BPM

## 2017-01-24 VITALS
HEART RATE: 78 BPM | SYSTOLIC BLOOD PRESSURE: 148 MMHG | DIASTOLIC BLOOD PRESSURE: 83 MMHG | TEMPERATURE: 98.4 F | OXYGEN SATURATION: 95 % | RESPIRATION RATE: 20 BRPM

## 2017-01-24 VITALS
RESPIRATION RATE: 18 BRPM | TEMPERATURE: 98.9 F | OXYGEN SATURATION: 97 % | HEART RATE: 96 BPM | SYSTOLIC BLOOD PRESSURE: 157 MMHG | DIASTOLIC BLOOD PRESSURE: 84 MMHG

## 2017-01-24 VITALS
RESPIRATION RATE: 18 BRPM | TEMPERATURE: 96.2 F | DIASTOLIC BLOOD PRESSURE: 81 MMHG | SYSTOLIC BLOOD PRESSURE: 129 MMHG | OXYGEN SATURATION: 95 % | HEART RATE: 98 BPM

## 2017-01-24 VITALS — OXYGEN SATURATION: 96 %

## 2017-01-24 VITALS — HEART RATE: 96 BPM

## 2017-01-24 LAB
ANION GAP SERPL CALC-SCNC: 10 MEQ/L (ref 5–15)
BUN SERPL-MCNC: 24 MG/DL (ref 7–18)
CHLORIDE SERPL-SCNC: 100 MEQ/L (ref 98–107)
GFR SERPLBLD BASED ON 1.73 SQ M-ARVRAT: 74 ML/MIN (ref 89–?)
HCO3 BLD-SCNC: 26.9 MEQ/L (ref 21–32)
MAGNESIUM SERPL-MCNC: 2.4 MG/DL (ref 1.5–2.5)
POTASSIUM SERPL-SCNC: 4.3 MEQ/L (ref 3.5–5.1)
SODIUM SERPL-SCNC: 137 MEQ/L (ref 136–145)

## 2017-01-24 RX ADMIN — IPRATROPIUM BROMIDE AND ALBUTEROL SULFATE SCH AMPULE: .5; 3 SOLUTION RESPIRATORY (INHALATION) at 10:14

## 2017-01-24 RX ADMIN — FLUTICASONE PROPIONATE SCH SPRAY: 50 SPRAY, METERED NASAL at 09:01

## 2017-01-24 RX ADMIN — GUAIFENESIN SCH MG: 600 TABLET, EXTENDED RELEASE ORAL at 09:00

## 2017-01-24 RX ADMIN — BUDESONIDE AND FORMOTEROL FUMARATE DIHYDRATE SCH PUFF: 160; 4.5 AEROSOL RESPIRATORY (INHALATION) at 21:25

## 2017-01-24 RX ADMIN — IPRATROPIUM BROMIDE AND ALBUTEROL SULFATE SCH AMPULE: .5; 3 SOLUTION RESPIRATORY (INHALATION) at 19:35

## 2017-01-24 RX ADMIN — FUROSEMIDE SCH MG: 20 TABLET ORAL at 09:00

## 2017-01-24 RX ADMIN — INSULIN ASPART SCH: 100 INJECTION, SOLUTION INTRAVENOUS; SUBCUTANEOUS at 11:39

## 2017-01-24 RX ADMIN — IPRATROPIUM BROMIDE AND ALBUTEROL SULFATE SCH AMPULE: .5; 3 SOLUTION RESPIRATORY (INHALATION) at 12:59

## 2017-01-24 RX ADMIN — METHYLPREDNISOLONE SODIUM SUCCINATE SCH MG: 40 INJECTION, POWDER, FOR SOLUTION INTRAMUSCULAR; INTRAVENOUS at 05:49

## 2017-01-24 RX ADMIN — TIOTROPIUM BROMIDE SCH MCG: 18 CAPSULE ORAL; RESPIRATORY (INHALATION) at 09:01

## 2017-01-24 RX ADMIN — INSULIN ASPART SCH: 100 INJECTION, SOLUTION INTRAVENOUS; SUBCUTANEOUS at 21:25

## 2017-01-24 RX ADMIN — GUAIFENESIN SCH MG: 600 TABLET, EXTENDED RELEASE ORAL at 21:24

## 2017-01-24 RX ADMIN — SODIUM CHLORIDE, PRESERVATIVE FREE SCH ML: 5 INJECTION INTRAVENOUS at 09:01

## 2017-01-24 RX ADMIN — SODIUM CHLORIDE, PRESERVATIVE FREE SCH ML: 5 INJECTION INTRAVENOUS at 21:24

## 2017-01-24 RX ADMIN — BUDESONIDE AND FORMOTEROL FUMARATE DIHYDRATE SCH PUFF: 160; 4.5 AEROSOL RESPIRATORY (INHALATION) at 09:00

## 2017-01-24 RX ADMIN — LORATADINE SCH MG: 10 TABLET ORAL at 09:00

## 2017-01-24 RX ADMIN — INSULIN ASPART SCH: 100 INJECTION, SOLUTION INTRAVENOUS; SUBCUTANEOUS at 06:29

## 2017-01-24 RX ADMIN — ATORVASTATIN CALCIUM SCH MG: 10 TABLET, FILM COATED ORAL at 21:25

## 2017-01-24 RX ADMIN — INSULIN ASPART SCH: 100 INJECTION, SOLUTION INTRAVENOUS; SUBCUTANEOUS at 16:13

## 2017-01-24 NOTE — HHI.PR
Subjective


Remarks


Follow-up COPD.  Improving shortness of breath and exercise tolerance.  He 

ambulated to the restroom.  History of bronchiectasis.  CT results discussed 

with the patient to follow-up with his pulmonologist





Objective


Vitals





 Vital Signs








  Date Time  Temp Pulse Resp B/P Pulse Ox O2 Delivery O2 Flow Rate FiO2


 


1/24/17 08:00 98.5 81 20 134/79 96   


 


1/24/17 05:25 98.4 78 20 148/83 95   


 


1/23/17 23:14 98.6 92 20 158/81 96   


 


1/23/17 20:31 98.6 99 20 156/80 95   


 


1/23/17 20:08  92      


 


1/23/17 19:58     95 Nasal Cannula 3.00 


 


1/23/17 16:38 96.5 90 18 143/87 92   


 


1/23/17 15:44  90      


 


1/23/17 12:46 96.3 83 18 151/90 96   








 I/O








 1/23/17 1/23/17 1/23/17 1/24/17 1/24/17 1/24/17





 07:00 15:00 23:00 07:00 15:00 23:00


 


Intake Total   960 ml 240 ml  


 


Output Total 800 ml     


 


Balance -800 ml  960 ml 240 ml  


 


      


 


Intake Oral   960 ml 240 ml  


 


Output Urine Total 800 ml     


 


# Voids   4 5  








Result Diagram:  


1/23/17 0516                                                                   

             1/24/17 0720





Imaging





Last Impressions








CT Angiography 1/22/17 1940 Signed





Impressions: 





 Service Date/Time:  Sunday, January 22, 2017 20:48 - CONCLUSION:  1.  No 





 pulmonary embolus. 2.  Increased density in the posteromedial left lower lobe 





 with possible bronchiectasis.  There is also some adenopathy in the left side 

of 





 the mediastinum in the AP window, left tracheobronchial region, left hilum and 





 the subcarinal regions.   Clinton Hernandez MD 


 


Chest X-Ray 1/22/17 1090 Signed





Impressions: 





 Service Date/Time:  Sunday, January 22, 2017 18:05 - CONCLUSION:  No acute 





 cardiopulmonary process. Apparent calcification of the mitral valve annulus.  

   





 Jose Chappell MD 








Objective Remarks


GENERAL: Well-developed, well-nourished in no distress


SKIN: Warm and dry.


HEAD: Atraumatic. Normocephalic. 


EYES: Pupils equal and round. No scleral icterus. No injection or drainage. 


ENT: No nasal bleeding or discharge.  Mucous membranes pink and moist.


NECK: Trachea midline. No JVD. 


CARDIOVASCULAR: Regular rate and rhythm.  


RESPIRATORY: No accessory muscle use. Clear to auscultation. Breath sounds 

equal bilaterally. 


GASTROINTESTINAL: Abdomen soft, non-tender, nondistended.


MUSCULOSKELETAL: Extremities without clubbing, cyanosis, or edema. No obvious 

deformities. 


NEUROLOGICAL: Awake and alert. No obvious cranial nerve deficits.  Motor 

grossly within normal limits. Five out of 5 muscle strength in the arms and 

legs.  Normal speech.


PSYCHIATRIC: Appropriate mood and affect; insight and judgment normal.


Procedures


None





A/P


Problem List:  


(1) COPD (chronic obstructive pulmonary disease)


ICD Code:  J44.9


Status:  Acute


(2) Hypoxia


ICD Code:  R09.02


Status:  Acute


(3) HTN (hypertension)


ICD Code:  I10


Status:  Chronic


(4) DM (diabetes mellitus)


ICD Code:  E11.9


Status:  Chronic


Assessment and Plan


66-year-old male with a PMH of HTN, DM, COPD, O2 Dependent and Prostate CA who 

was referred to the ER from Urgent Care for hypoxia with O2 sat 88% on RA





COPD:  Chronic Respiratory Failure on home O2 as needed, now w/ Acute 

Exacerbation. S/p Z-pack x3 days by PCP w/ no improvement.  Improving.  

Continue steroids switch to by mouth taper.  DuoNebs scheduled and as needed. 

Symbicort, Spiriva, Mucinex.  Continue Levaquin switch to by mouth.  CXR w/ no 

acute findings.  CTA Pulm ordered in the ED; no PE and left lower lobe 

bronchiectasis, left mediastinal adenopathy results discussed with the patient.

  Outpatient follow-up for DC.  





Sinusitis: Chronic.  Continue fluticasone.  Continue Claritin.





DM:  Sliding scale w/ Accu-Cheks.  On Metformin and Nateglinide at home.  

Uncontrolled secondary to steroids.  Restart home medications





HTN: Not optimally controlled, likely compounded by respiratory distress.  

Continue home furosemide.  Continue to monitor





DVT Prophylaxis:  SCD/Teds.


Discharge Planning


Possible discharge later today





Problem Qualifiers





(1) COPD (chronic obstructive pulmonary disease):  


Qualified Code:  J44.1 - Chronic obstructive pulmonary disease with acute 

exacerbation


(2) HTN (hypertension):  


Qualified Code:  I10 - Essential hypertension


(3) DM (diabetes mellitus):  





Abando,Franklin MD Jan 24, 2017 08:42

## 2017-01-24 NOTE — HHI.DCPOC
Discharge Care Plan


Diagnosis:  


(1) DM (diabetes mellitus)


(2) COPD exacerbation


(3) Hypoxia


Your Health Problems Are:     Difficulty with ADL


         Exercise Tolerance


         Shortness of Breath


Goals to Promote Your Health


* To prevent worsening of your condition and complications


* To maintain your health at the optimal level


Directions to Meet Your Goals


*** Take your medications as prescribed


*** Follow your dietary instruction


*** Follow activity as directed








*** Keep your appointments as scheduled


*** Take your immunizations and boosters as scheduled


*** If your symptoms worsen call your PCP, if no PCP go to Urgent Care Center 

or Emergency Room***


*** Smoking is Dangerous to Your Health. Avoid second hand smoke***


***Call the 24-hour hour crisis hotline for domestic abuse at 1-537.162.6423***








Billy Stein MD Jan 24, 2017 08:48

## 2017-01-25 VITALS
DIASTOLIC BLOOD PRESSURE: 77 MMHG | SYSTOLIC BLOOD PRESSURE: 138 MMHG | TEMPERATURE: 97.9 F | HEART RATE: 68 BPM | RESPIRATION RATE: 18 BRPM | OXYGEN SATURATION: 97 %

## 2017-01-25 VITALS
OXYGEN SATURATION: 97 % | HEART RATE: 97 BPM | DIASTOLIC BLOOD PRESSURE: 69 MMHG | RESPIRATION RATE: 18 BRPM | TEMPERATURE: 97.8 F | SYSTOLIC BLOOD PRESSURE: 138 MMHG

## 2017-01-25 VITALS — HEART RATE: 88 BPM

## 2017-01-25 VITALS — OXYGEN SATURATION: 97 %

## 2017-01-25 VITALS
TEMPERATURE: 98.8 F | RESPIRATION RATE: 16 BRPM | OXYGEN SATURATION: 98 % | HEART RATE: 87 BPM | SYSTOLIC BLOOD PRESSURE: 142 MMHG | DIASTOLIC BLOOD PRESSURE: 82 MMHG

## 2017-01-25 RX ADMIN — GUAIFENESIN SCH MG: 600 TABLET, EXTENDED RELEASE ORAL at 08:35

## 2017-01-25 RX ADMIN — SODIUM CHLORIDE, PRESERVATIVE FREE SCH ML: 5 INJECTION INTRAVENOUS at 08:34

## 2017-01-25 RX ADMIN — FUROSEMIDE SCH MG: 20 TABLET ORAL at 08:35

## 2017-01-25 RX ADMIN — INSULIN ASPART SCH: 100 INJECTION, SOLUTION INTRAVENOUS; SUBCUTANEOUS at 06:48

## 2017-01-25 RX ADMIN — IPRATROPIUM BROMIDE AND ALBUTEROL SULFATE SCH AMPULE: .5; 3 SOLUTION RESPIRATORY (INHALATION) at 07:25

## 2017-01-25 RX ADMIN — LORATADINE SCH MG: 10 TABLET ORAL at 08:35

## 2017-01-25 RX ADMIN — FLUTICASONE PROPIONATE SCH SPRAY: 50 SPRAY, METERED NASAL at 08:35

## 2017-01-25 RX ADMIN — BUDESONIDE AND FORMOTEROL FUMARATE DIHYDRATE SCH PUFF: 160; 4.5 AEROSOL RESPIRATORY (INHALATION) at 08:35

## 2017-01-25 RX ADMIN — TIOTROPIUM BROMIDE SCH MCG: 18 CAPSULE ORAL; RESPIRATORY (INHALATION) at 08:35

## 2017-01-25 NOTE — HHI.PR
Subjective


Remarks


Follow-up COPD.  He is doing okay impression shortness of breath with increased 

exercise tolerance.  Requesting records to be forwarded to his pulmonologist 

Dr. Almendarez.  Discussed with RN





Objective


Vitals





 Vital Signs








  Date Time  Temp Pulse Resp B/P Pulse Ox O2 Delivery O2 Flow Rate FiO2


 


1/25/17 08:40 98.8 87 16 142/82 98   


 


1/25/17 07:27     97 Nasal Cannula 2.00 


 


1/25/17 05:20 97.9 68 18 138/77 97   


 


1/25/17 01:22 97.8 97 18 138/69 97   


 


1/24/17 20:42  96      


 


1/24/17 19:36     96 Nasal Cannula 2.00 


 


1/24/17 19:07 97.9 91 18 141/77 96   


 


1/24/17 16:00 98.9 96 18 157/84 97   


 


1/24/17 12:00 96.2 98 18 129/81 95   








 I/O








 1/24/17 1/24/17 1/24/17 1/25/17 1/25/17 1/25/17





 07:00 15:00 23:00 07:00 15:00 23:00


 


Intake Total 240 ml 1440 ml    


 


Balance 240 ml 1440 ml    


 


      


 


Intake Oral 240 ml 1440 ml    


 


# Voids 5 3  2  


 


# Bowel Movements  0    








Result Diagram:  


1/23/17 0516                                                                   

             1/24/17 0720





Imaging





Last Impressions








CT Angiography 1/22/17 1940 Signed





Impressions: 





 Service Date/Time:  Sunday, January 22, 2017 20:48 - CONCLUSION:  1.  No 





 pulmonary embolus. 2.  Increased density in the posteromedial left lower lobe 





 with possible bronchiectasis.  There is also some adenopathy in the left side 

of 





 the mediastinum in the AP window, left tracheobronchial region, left hilum and 





 the subcarinal regions.   Clinton Hernandez MD 


 


Chest X-Ray 1/22/17 0683 Signed





Impressions: 





 Service Date/Time:  Sunday, January 22, 2017 18:05 - CONCLUSION:  No acute 





 cardiopulmonary process. Apparent calcification of the mitral valve annulus.  

   





 Jose Chappell MD 








Objective Remarks


GENERAL: Well-developed, well-nourished in no distress


SKIN: Warm and dry.


HEAD: Atraumatic. Normocephalic. 


EYES: Pupils equal and round. No scleral icterus. No injection or drainage. 


ENT: No nasal bleeding or discharge.  Mucous membranes pink and moist.


NECK: Trachea midline. No JVD. 


CARDIOVASCULAR: Regular rate and rhythm.  


RESPIRATORY: No accessory muscle use. Clear to auscultation. Breath sounds 

equal bilaterally. 


GASTROINTESTINAL: Abdomen soft, non-tender, nondistended.


MUSCULOSKELETAL: Extremities without clubbing, cyanosis, or edema. No obvious 

deformities. 


NEUROLOGICAL: Awake and alert. No obvious cranial nerve deficits.  Motor 

grossly within normal limits. Five out of 5 muscle strength in the arms and 

legs.  Normal speech.


PSYCHIATRIC: Appropriate mood and affect; insight and judgment normal.


Procedures


None





A/P


Problem List:  


(1) COPD (chronic obstructive pulmonary disease)


ICD Code:  J44.9


Status:  Acute


(2) Hypoxia


ICD Code:  R09.02


Status:  Acute


(3) HTN (hypertension)


ICD Code:  I10


Status:  Chronic


(4) DM (diabetes mellitus)


ICD Code:  E11.9


Status:  Chronic


Assessment and Plan


66-year-old male with a PMH of HTN, DM, COPD, O2 Dependent and Prostate CA who 

was referred to the ER from Urgent Care for hypoxia with O2 sat 88% on RA





COPD:  Chronic Respiratory Failure on home O2 as needed, now w/ Acute 

Exacerbation. S/p Z-pack x3 days by PCP w/ no improvement.  Improving.  

Continue steroids switch to by mouth.  DuoNebs scheduled and as needed. 

Symbicort, Spiriva, Mucinex.  Continue Levaquin switch to by mouth.  CXR w/ no 

acute findings.  CTA Pulm ordered in the ED; no PE and left lower lobe 

bronchiectasis, left mediastinal adenopathy results discussed with the patient.

  Outpatient follow-up for DC.  





Sinusitis: Chronic.  Continue fluticasone.  Continue Claritin.





DM:  Sliding scale w/ Accu-Cheks.  On Metformin and Nateglinide at home.  

Uncontrolled secondary to steroids.  Restart home medications.  Improving





HTN: Not optimally controlled, likely compounded by respiratory distress.  

Continue home furosemide.  Continue to monitor.  Improving





DVT Prophylaxis:  SCD/Teds.


Discharge Planning


Stable for discharge





Problem Qualifiers





(1) COPD (chronic obstructive pulmonary disease):  


Qualified Code:  J44.1 - Chronic obstructive pulmonary disease with acute 

exacerbation


(2) HTN (hypertension):  


Qualified Code:  I10 - Essential hypertension


(3) DM (diabetes mellitus):  





Billy Stein MD 25, 2017 09:18

## 2017-01-25 NOTE — HHI.DS
__________________________________________________





Discharge Summary


Admission Date


Jan 22, 2017 at 19:52


Discharge Date:  Jan 25, 2017


Admitting Diagnosis


COPD exacerbation, hypoxia





(1) COPD (chronic obstructive pulmonary disease)


ICD Code:  J44.9


Diagnosis:  Principal





(2) Hypoxia


ICD Code:  R09.02


Diagnosis:  Principal





(3) HTN (hypertension)


ICD Code:  I10


Diagnosis:  Secondary





(4) DM (diabetes mellitus)


ICD Code:  E11.9


Diagnosis:  Secondary





Procedures


None


Brief History - From Admission


This is a 66-year-old male with a PMH of HTN, DM, COPD, O2 Dependent and 

Prostate CA who was referred to the ER from Urgent Care for hypoxia with O2 sat 

88% on RA.  Pt states he's had ongoing SOB and productive cough w/ white-

colored sputum x3 days, started on Z-pack by PCP, taking meds as prescribed w/ 

minimal relief.  Presented to Urgent Care today for ongoing symptoms and found 

to have hypoxia.  Denies fever or chills, no chest pain.  On arrival, /91

, , O2 sat 94% on RA, Temp 99.1.  WBC 13.8.  Chemistry essentially 

unremarkable.  UA negative.  CXR with no acute findings.  CTA Pulm pending.  S/

p Levaquin and DuoNeb in ER w/ some improvement.


CBC/BMP:  


1/23/17 0516                                                                   

             1/24/17 0720





Significant Findings





Laboratory Tests








Test 1/22/17 1/22/17 1/23/17 1/24/17





 18:00 18:45 05:16 07:20


 


White Blood Count 13.8 TH/MM3  11.3 TH/MM3 





 (4.0-11.0)  (4.0-11.0) 


 


Neutrophils (%) (Auto) 72.6 %  88.7 % 





 (16.0-70.0)  (16.0-70.0) 


 


Lymphocytes (%) (Auto) 5.4 %  4.4 % 





 (9.0-44.0)  (9.0-44.0) 


 


Eosinophils (%) (Auto) 14.7 %  4.7 % (0.0-4.0) 





 (0.0-4.0)   


 


Neutrophils # (Auto) 10.0 TH/MM3  10.1 TH/MM3 





 (1.8-7.7)  (1.8-7.7) 


 


Lymphocytes # (Auto) 0.7 TH/MM3  0.5 TH/MM3 





 (1.0-4.8)  (1.0-4.8) 


 


Eosinophils # (Auto) 2.0 TH/MM3  0.5 TH/MM3 





 (0-0.4)  (0-0.4) 


 


Activated Partial 23.0 SEC   





Thromboplast Time (24.3-30.1)   


 


Blood Urea Nitrogen 23 MG/DL (7-18)   24 MG/DL (7-18)


 


Estimat Glomerular Filtration 80 ML/MIN (>89)  84 ML/MIN (>89) 74 ML/MIN (>89)





Rate    


 


Random Glucose 122 MG/DL  243 MG/ MG/DL





 ()  () ()


 


Aspartate Amino Transf 8 U/L (15-37)  9 U/L (15-37) 





(AST/SGOT)    


 


Troponin I LESS THAN 0.02   





 NG/ML   





 (0.02-0.05)   


 


Albumin 3.1 GM/DL  2.8 GM/DL 





 (3.4-5.0)  (3.4-5.0) 


 


Urine Occult Blood  TRACE  (NEG)  


 


Urine Mucus  FEW /lpf (OCC)  


 


Calcium Level   8.3 MG/DL 





   (8.5-10.1) 


 


Total Bilirubin   1.3 MG/DL 





   (0.2-1.0) 








Imaging





Last Impressions








CT Angiography 1/22/17 1940 Signed





Impressions: 





 Service Date/Time:  Sunday, January 22, 2017 20:48 - CONCLUSION:  1.  No 





 pulmonary embolus. 2.  Increased density in the posteromedial left lower lobe 





 with possible bronchiectasis.  There is also some adenopathy in the left side 

of 





 the mediastinum in the AP window, left tracheobronchial region, left hilum and 





 the subcarinal regions.   Clinton Hernandez MD 


 


Chest X-Ray 1/22/17 2432 Signed





Impressions: 





 Service Date/Time:  Sunday, January 22, 2017 18:05 - CONCLUSION:  No acute 





 cardiopulmonary process. Apparent calcification of the mitral valve annulus.  

   





 Jose Chappell MD 








PE at Discharge


GENERAL: Well-developed, well-nourished in no distress


SKIN: Warm and dry.


HEAD: Atraumatic. Normocephalic. 


EYES: Pupils equal and round. No scleral icterus. No injection or drainage. 


ENT: No nasal bleeding or discharge.  Mucous membranes pink and moist.


NECK: Trachea midline. No JVD. 


CARDIOVASCULAR: Regular rate and rhythm.  


RESPIRATORY: No accessory muscle use. Clear to auscultation. Breath sounds 

equal bilaterally. 


GASTROINTESTINAL: Abdomen soft, non-tender, nondistended.


MUSCULOSKELETAL: Extremities without clubbing, cyanosis, or edema. No obvious 

deformities. 


NEUROLOGICAL: Awake and alert. No obvious cranial nerve deficits.  Motor 

grossly within normal limits. Five out of 5 muscle strength in the arms and 

legs.  Normal speech.


PSYCHIATRIC: Appropriate mood and affect; insight and judgment normal.


Hospital Course


66-year-old male with a PMH of HTN, DM, COPD, O2 Dependent and Prostate CA who 

was referred to the ER from Urgent Care for hypoxia with O2 sat 88% on RA





COPD:  Chronic Respiratory Failure on home O2 as needed, now w/ Acute 

Exacerbation. S/p Z-pack x3 days by PCP w/ no improvement.  Improving.  

Continue steroids switch to by mouth.  DuoNebs scheduled and as needed. 

Symbicort, Spiriva, Mucinex.  Continue Levaquin switch to by mouth.  CXR w/ no 

acute findings.  CTA Pulm ordered in the ED; no PE and left lower lobe 

bronchiectasis, left mediastinal adenopathy results discussed with the patient.

  Outpatient follow-up for DC.  





Sinusitis: Chronic.  Continue fluticasone.  Continue Claritin.





DM:  Sliding scale w/ Accu-Cheks.  On Metformin and Nateglinide at home.  

Uncontrolled secondary to steroids.  Restart home medications.  Improving





HTN: Not optimally controlled, likely compounded by respiratory distress.  

Continue home furosemide.  Continue to monitor.  Improving





DVT Prophylaxis:  SCD/Teds.


Pt Condition on Discharge:  Stable


Discharge Disposition:  Discharge Home


Discharge Time:  <= 30 minutes


Discharge Instructions


DIET: Follow Instructions for:  Heart Healthy Diet, Diabetic Diet


Activities you can perform:  Regular-No Restrictions


Activities to Avoid:  Driving


Follow up Referrals:  


PCP Follow-up - 2-3 Days


Pulmonology - 1 Week





New Medications:  


Levofloxacin (Levaquin) 750 Mg Tab


750 MG PO DAILY Stop date 01/30/17 Infection #6 Ref 0 TAB


Fluticasone Nasal Spray (Fluticasone Nasal Spray) 50 Mcg/Act Naspr


2 SPRAY NASAL DAILY Allergy Management #1 BOTTLE


Hydrocodone-Acetaminophen (Hydrocodone-Acetaminophen) 5-325 mg Tab


1 TAB PO Q6HR PRN pain #28 TAB


Loratadine (Claritin) 10 Mg Tab


10 MG PO DAILY Allergy Management #30 TAB


Prednisone (Prednisone) 20 Mg Tab


40 MG PO DAILY Control Inflammation #10 TAB


 


Continued Medications:  


Albuterol Neb (Albuterol Neb) 1.25 Mg/3 Ml Neb


1.25 MG NEB TID NEB PRN SHORTNESS OF BREATH #100 Ref 0 NEBULE


Atorvastatin (Atorvastatin) 10 Mg Tab


10 MG PO HS Cholesterol Management #30 Ref 0 TAB


Calcium Carbonate-Cholecalciferol (Caltrate 600+D) 600-800 Mg-Unit Tab


1 TAB PO BID Calcium Supplement Ref 0 TAB


Furosemide (Furosemide) 20 Mg Tab


20 MG PO DAILY #30 Ref 0 TAB


Metformin ER (Metformin ER) 500 Mg Fredi


500 MG PO TID With evening meal Blood Sugar Management Ref 0 TAB


Nateglinide (Nateglinide) 120 Mg Tab


120 MG PO TIDAC Blood Sugar Management #90 Ref 0 TAB


Tiotropium Inh (Spiriva Handihaler) 18 Mcg Cap


18 MCG INH DAILY 1 capsule = 18 mcg COPD #30 Ref 0 CAP











Billy Stein MD Jan 25, 2017 09:21

## 2017-01-26 ENCOUNTER — HOSPITAL ENCOUNTER (EMERGENCY)
Dept: HOSPITAL 17 - NEPC | Age: 67
Discharge: HOME | End: 2017-01-26
Payer: MEDICARE

## 2017-01-26 VITALS — RESPIRATION RATE: 26 BRPM | OXYGEN SATURATION: 91 %

## 2017-01-26 VITALS
HEART RATE: 110 BPM | RESPIRATION RATE: 24 BRPM | DIASTOLIC BLOOD PRESSURE: 74 MMHG | SYSTOLIC BLOOD PRESSURE: 151 MMHG | OXYGEN SATURATION: 88 % | TEMPERATURE: 99.3 F

## 2017-01-26 VITALS
SYSTOLIC BLOOD PRESSURE: 143 MMHG | DIASTOLIC BLOOD PRESSURE: 77 MMHG | OXYGEN SATURATION: 97 % | HEART RATE: 96 BPM | RESPIRATION RATE: 21 BRPM | TEMPERATURE: 99.3 F

## 2017-01-26 VITALS — SYSTOLIC BLOOD PRESSURE: 145 MMHG | DIASTOLIC BLOOD PRESSURE: 73 MMHG

## 2017-01-26 VITALS — HEIGHT: 69 IN | WEIGHT: 154.32 LBS | BODY MASS INDEX: 22.86 KG/M2

## 2017-01-26 DIAGNOSIS — J44.1: Primary | ICD-10-CM

## 2017-01-26 DIAGNOSIS — E11.9: ICD-10-CM

## 2017-01-26 DIAGNOSIS — I10: ICD-10-CM

## 2017-01-26 DIAGNOSIS — E78.00: ICD-10-CM

## 2017-01-26 LAB
ANION GAP SERPL CALC-SCNC: 8 MEQ/L (ref 5–15)
BASOPHILS # BLD AUTO: 0 TH/MM3 (ref 0–0.2)
BASOPHILS NFR BLD: 0.2 % (ref 0–2)
BUN SERPL-MCNC: 26 MG/DL (ref 7–18)
CHLORIDE SERPL-SCNC: 99 MEQ/L (ref 98–107)
EOSINOPHIL # BLD: 1.3 TH/MM3 (ref 0–0.4)
EOSINOPHIL NFR BLD: 10 % (ref 0–4)
EOSINOPHIL NFR BLD: 7.7 % (ref 0–4)
ERYTHROCYTE [DISTWIDTH] IN BLOOD BY AUTOMATED COUNT: 15.2 % (ref 11.6–17.2)
GFR SERPLBLD BASED ON 1.73 SQ M-ARVRAT: 66 ML/MIN (ref 89–?)
HCO3 BLD-SCNC: 30.5 MEQ/L (ref 21–32)
HCT VFR BLD CALC: 44.7 % (ref 39–51)
HEMO FLAGS: (no result)
LYMPHOCYTES # BLD AUTO: 0.2 TH/MM3 (ref 1–4.8)
LYMPHOCYTES NFR BLD AUTO: 1.5 % (ref 9–44)
MCH RBC QN AUTO: 28.5 PG (ref 27–34)
MCHC RBC AUTO-ENTMCNC: 34.6 % (ref 32–36)
MCV RBC AUTO: 82.5 FL (ref 80–100)
MONOCYTES NFR BLD: 2.6 % (ref 0–8)
NEUTROPHILS # BLD AUTO: 14.3 TH/MM3 (ref 1.8–7.7)
NEUTROPHILS NFR BLD AUTO: 88 % (ref 16–70)
NEUTS BAND # BLD MANUAL: 13.7 TH/MM3 (ref 1.8–7.7)
NEUTS BAND NFR BLD: 4 % (ref 0–6)
NEUTS SEG NFR BLD MANUAL: 80 % (ref 16–70)
PLAT MORPH BLD: (no result)
PLATELET # BLD: 201 TH/MM3 (ref 150–450)
PLATELET BLD QL SMEAR: NORMAL
POTASSIUM SERPL-SCNC: 4 MEQ/L (ref 3.5–5.1)
RBC # BLD AUTO: 5.42 MIL/MM3 (ref 4.5–5.9)
SCAN/DIFF: (no result)
SODIUM SERPL-SCNC: 137 MEQ/L (ref 136–145)
WBC # BLD AUTO: 16.3 TH/MM3 (ref 4–11)
WBC DIFF SAMPLE: 100
WBC TOXIC VACUOLES BLD QL SMEAR: PRESENT

## 2017-01-26 PROCEDURE — 80048 BASIC METABOLIC PNL TOTAL CA: CPT

## 2017-01-26 PROCEDURE — 93005 ELECTROCARDIOGRAM TRACING: CPT

## 2017-01-26 PROCEDURE — 99285 EMERGENCY DEPT VISIT HI MDM: CPT

## 2017-01-26 PROCEDURE — 85027 COMPLETE CBC AUTOMATED: CPT

## 2017-01-26 PROCEDURE — 96374 THER/PROPH/DIAG INJ IV PUSH: CPT

## 2017-01-26 PROCEDURE — 83880 ASSAY OF NATRIURETIC PEPTIDE: CPT

## 2017-01-26 PROCEDURE — 71010: CPT

## 2017-01-26 PROCEDURE — 94640 AIRWAY INHALATION TREATMENT: CPT

## 2017-01-26 PROCEDURE — 85007 BL SMEAR W/DIFF WBC COUNT: CPT

## 2017-01-26 PROCEDURE — 94664 DEMO&/EVAL PT USE INHALER: CPT

## 2017-01-26 RX ADMIN — ALBUTEROL SULFATE SCH MG: 2.5 SOLUTION RESPIRATORY (INHALATION) at 05:53

## 2017-01-26 RX ADMIN — ALBUTEROL SULFATE SCH MG: 2.5 SOLUTION RESPIRATORY (INHALATION) at 05:54

## 2017-01-26 NOTE — EKG
Date Performed: 01/26/2017       Time Performed: 05:55:13

 

PTAGE:      66 years

 

EKG:      SINUS TACHYCARDIA POSSIBLE LEFT ATRIAL ENLARGEMENT Compared to prior tracing no significant
 change ABNORMAL RHYTHM ECG

 

PREVIOUS TRACING        1/22/2017 @17.54.19

 

DOCTOR:   Sid Godoy  Interpretating Date/Time  01/26/2017 16:19:44

## 2017-01-26 NOTE — PD
Physical Exam


Date Seen by Provider:  Jan 26, 2017


Narrative


The nurse came to me asking for the prescription that was ordered by the 

previous ER physician but somehow never got printed.  I've generated this chart 

in order to be new and present the prescription so that patient can get it.  

Patient has already been discharged.





Data


Data


Last Documented VS





Vital Signs








  Date Time  Temp Pulse Resp B/P Pulse Ox O2 Delivery O2 Flow Rate FiO2


 


1/26/17 10:06  91 18 145/73 98 Nasal Cannula 3 


 


1/26/17 08:00 99.3       








Orders





 Electrocardiogram (1/26/17 05:42)


Basic Metabolic Panel (Bmp) (1/26/17 05:42)


Complete Blood Count With Diff (1/26/17 05:42)


Chest, Single Ap (1/26/17 05:42)


Ecg Monitoring (1/26/17 05:42)


Iv Access Insert/Monitor (1/26/17 05:42)


Oximetry (1/26/17 05:42)


Oxygen Administration (1/26/17 05:42)


Sodium Chloride 0.9% Flush (Ns Flush) (1/26/17 05:45)


B-Type Natriuretic Peptide (1/26/17 05:42)


Albuterol Neb (Albuterol Neb) (1/26/17 05:45)


Methylprednisolone So Succ Inj (Solumedr (1/26/17 06:00)





Labs





 Laboratory Tests








Test 1/26/17





 05:40


 


White Blood Count 16.3 TH/MM3


 


Red Blood Count 5.42 MIL/MM3


 


Hemoglobin 15.5 GM/DL


 


Hematocrit 44.7 %


 


Mean Corpuscular Volume 82.5 FL


 


Mean Corpuscular Hemoglobin 28.5 PG


 


Mean Corpuscular Hemoglobin 34.6 %





Concent 


 


Red Cell Distribution Width 15.2 %


 


Platelet Count 201 TH/MM3


 


Mean Platelet Volume 8.6 FL


 


Neutrophils (%) (Auto) 88.0 %


 


Lymphocytes (%) (Auto) 1.5 %


 


Monocytes (%) (Auto) 2.6 %


 


Eosinophils (%) (Auto) 7.7 %


 


Basophils (%) (Auto) 0.2 %


 


Neutrophils # (Auto) 14.3 TH/MM3


 


Lymphocytes # (Auto) 0.2 TH/MM3


 


Monocytes # (Auto) 0.4 TH/MM3


 


Eosinophils # (Auto) 1.3 TH/MM3


 


Basophils # (Auto) 0.0 TH/MM3


 


CBC Comment AUTO DIFF 


 


Differential Total Cells 100 





Counted 


 


Neutrophils % (Manual) 80 %


 


Band Neutrophils % 4 %


 


Lymphocytes % 1 %


 


Monocytes % 5 %


 


Eosinophils % 10 %


 


Neutrophils # (Manual) 13.7 TH/MM3


 


Differential Comment FINAL DIFF





 MANUAL


 


Toxic Vacuolation PRESENT 


 


Platelet Estimate NORMAL 


 


Platelet Morphology Comment CLUMPED 


 


Sodium Level 137 MEQ/L


 


Potassium Level 4.0 MEQ/L


 


Chloride Level 99 MEQ/L


 


Carbon Dioxide Level 30.5 MEQ/L


 


Anion Gap 8 MEQ/L


 


Blood Urea Nitrogen 26 MG/DL


 


Creatinine 1.31 MG/DL


 


Estimat Glomerular Filtration 66 ML/MIN





Rate 


 


Random Glucose 149 MG/DL


 


Calcium Level 8.9 MG/DL


 


B-Type Natriuretic Peptide 8 PG/ML











MDM


Supervised Visit with WESLEY:  No


Diagnosis





 Primary Impression:  


 COPD exacerbation


Patient Instructions:  General Instructions


Departure Forms:  Tests/Procedures





***Additional Instruction:


If you develop severe shortness of breath, chest pain, or difficulty breathing 

return to the emergency department.





Use albuterol every 4 hours for the next 2 days.  Then use as needed for 

wheezing.


Complete your course of steroids.


Complete your course of antibiotics.





Follow up with your primary care physician in 2-3 days if your symptoms have 

not improved.


Scripts


Promethazine-Codeine Liq 6.25-10 Mg/5 Ml Syrp5 Ml PO Q4H PRN (COUGH AND/OR COLD 

SYMPTOMS) #120 ML


   Prov:Elvis Burnett MD         1/26/17


Disposition:  01 DISCHARGE HOME


Condition:  Stable








Elvis Burnett MD Jan 26, 2017 10:13

## 2017-01-26 NOTE — HHI.FF
Face to Face Verification


Diagnosis:  


(1) COPD (chronic obstructive pulmonary disease)


Physical Therapy


Order:  Evaluate and Treat, Improve ambulation, Strength and gait training





Home Health Nursing


Order:     Medical education


      Diabetic education


      Oxygen administration education


      Nursing assessment with vital signs








I have seen patient Pihlip Felix  on 1/26/17. My clinical findings support 

the need for the requested home health care services because:


Patient has SOB


Deconditioned w/ increased weakness








I certify that my clinical findings support that this patient is homebound 

because:


Hx COPD- exertion dyspnea/weakness








Ebony Gusman MD Jan 26, 2017 06:58

## 2017-01-26 NOTE — PD
HPI


Chief Complaint:  Respiratory Distress


Time Seen by Provider:  05:41


Travel History


International Travel<30 days:  No


Contact w/Intl Traveler<30days:  No


Traveled to known affect area:  No





History of Present Illness


HPI


This is a 66-year-old male who reports increasing shortness of breath, worse 

with exertion, described as difficulty catching his breath that started 

yesterday and has been progressing.  He was just discharged from the hospital 

yesterday in the setting of an acute COPD exacerbation.  He says when he was 

discharged he was feeling quite well when he started to walk around his house 

he became short of breath again.  At home he is on 2 L of oxygen.  When EMS 

arrived at the house they found him to be 88% on room air at 91% on his 2 L.  

He was not given any medications.  He did take 40 of prednisone this morning as 

well as his Lasix.





PFSH


Past Medical History


Arthritis:  No


Cancer:  Yes (PROSTATE)


Cardiovascular Problems:  Yes


High Cholesterol:  Yes


COPD:  Yes (EMPHYSEMA COPD)


Cerebrovascular Accident:  No


Diabetes:  Yes (TYPE 2)


Diminished Hearing:  No


Genitourinary:  Yes


Hypertension:  Yes


Immune Disorder:  No


Musculoskeletal:  Yes


Neurologic:  No


Psychiatric:  No


Reproductive:  No


Respiratory:  Yes (COPD)


Immunizations Current:  Yes


Migraines:  No


Radiation Therapy:  Yes (7-8 years ago)


Seizures:  No





Past Surgical History


Abdominal Surgery:  Yes


Other Surgery:  Yes (4 SINUS SURGERIES - 2000 - 2007)





Social History


Alcohol Use:  No


Tobacco Use:  No


Substance Use:  No





Allergies-Medications


(Allergen,Severity, Reaction):  


Coded Allergies:  


     Clindamycin (Verified  Allergy, Mild, RASH, 1/26/17)


     Tobramycin (Verified  Allergy, Mild, RASH, 1/26/17)


Reported Meds & Prescriptions





Reported Meds & Active Scripts


Active


Levaquin (Levofloxacin) 750 Mg Tab 750 Mg PO DAILY


     Stop date 01/30/17


Prednisone 20 Mg Tab 40 Mg PO DAILY


Claritin (Loratadine) 10 Mg Tab 10 Mg PO DAILY


Hydrocodone-Acetaminophen 5-325 mg Tab 1 Tab PO Q6HR PRN


Fluticasone Nasal Spray 50 Mcg/Act Naspr 2 [place] NASAL DAILY


Reported


Furosemide 20 Mg Tab 20 Mg PO DAILY


Amoxicillin 500 Mg Cap 500 Mg PO BID


Caltrate 600+D (Calcium Carbonate-Cholecalciferol) 600-800 Mg-Unit Tab 1 Tab PO 

BID


Spiriva Handihaler (Tiotropium Inh) 18 Mcg Cap 18 Mcg INH DAILY


     1 capsule = 18 mcg


Atorvastatin (Atorvastatin Calcium) 10 Mg Tab 10 Mg PO HS


Metformin ER (Metformin HCl) 500 Mg Fredi 500 Mg PO TID


     With evening meal


Nateglinide 120 Mg Tab 120 Mg PO TIDAC


Albuterol Neb (Albuterol Sulfate) 1.25 Mg/3 Ml Neb 1.25 Mg NEB TID NEB PRN


Prednisone 10 Mg Tab 10 Mg PO DAILY








Review of Systems


Except as stated in HPI:  all other systems reviewed are Neg





Physical Exam


Narrative


GENERAL:Well appearing, no acute distress


SKIN: Warm and dry.


HEAD: Atraumatic. Normocephalic. 


EYES: Pupils equal and round.  No injection or drainage. 


ENT:  Moist mucous membranes


NECK: Trachea midline. 


CARDIOVASCULAR: Regular rate and rhythm.  No murmur appreciated.


RESPIRATORY: Poor air movement, increased work of breathing


GASTROINTESTINAL: Abdomen soft, non-tender, nondistended. 


MUSCULOSKELETAL: No obvious deformities. 


NEUROLOGICAL: Awake and alert. No obvious cranial nerve deficits.  Moving all 

extremities.


PSYCHIATRIC: Appropriate mood and affect; insight and judgment normal.





Data


Data


Last Documented VS





Vital Signs








  Date Time  Temp Pulse Resp B/P Pulse Ox O2 Delivery O2 Flow Rate FiO2


 


1/26/17 05:47   26  91 Nasal Cannula 2 


 


1/26/17 05:45  103      


 


1/26/17 05:37 99.3   151/74    








Orders





 Electrocardiogram (1/26/17 05:42)


Basic Metabolic Panel (Bmp) (1/26/17 05:42)


Complete Blood Count With Diff (1/26/17 05:42)


Chest, Single Ap (1/26/17 05:42)


Ecg Monitoring (1/26/17 05:42)


Iv Access Insert/Monitor (1/26/17 05:42)


Oximetry (1/26/17 05:42)


Oxygen Administration (1/26/17 05:42)


Sodium Chloride 0.9% Flush (Ns Flush) (1/26/17 05:45)


B-Type Natriuretic Peptide (1/26/17 05:42)


Albuterol Neb (Albuterol Neb) (1/26/17 05:45)


Methylprednisolone So Succ Inj (Solumedr (1/26/17 06:00)





Labs








 Laboratory Tests








Test 1/26/17





 05:40


 


White Blood Count 16.3 TH/MM3


 


Red Blood Count 5.42 MIL/MM3


 


Hemoglobin 15.5 GM/DL


 


Hematocrit 44.7 %


 


Mean Corpuscular Volume 82.5 FL


 


Mean Corpuscular Hemoglobin 28.5 PG


 


Mean Corpuscular Hemoglobin 34.6 %





Concent 


 


Red Cell Distribution Width 15.2 %


 


Platelet Count 201 TH/MM3


 


Mean Platelet Volume 8.6 FL


 


Neutrophils (%) (Auto) 88.0 %


 


Lymphocytes (%) (Auto) 1.5 %


 


Monocytes (%) (Auto) 2.6 %


 


Eosinophils (%) (Auto) 7.7 %


 


Basophils (%) (Auto) 0.2 %


 


Neutrophils # (Auto) 14.3 TH/MM3


 


Lymphocytes # (Auto) 0.2 TH/MM3


 


Monocytes # (Auto) 0.4 TH/MM3


 


Eosinophils # (Auto) 1.3 TH/MM3


 


Basophils # (Auto) 0.0 TH/MM3


 


CBC Comment AUTO DIFF 


 


Differential Total Cells 100 





Counted 


 


Neutrophils % (Manual) 80 %


 


Band Neutrophils % 4 %


 


Lymphocytes % 1 %


 


Monocytes % 5 %


 


Eosinophils % 10 %


 


Neutrophils # (Manual) 13.7 TH/MM3


 


Differential Comment FINAL DIFF





 MANUAL


 


Toxic Vacuolation PRESENT 


 


Platelet Estimate NORMAL 


 


Platelet Morphology Comment CLUMPED 


 


Sodium Level 137 MEQ/L


 


Potassium Level 4.0 MEQ/L


 


Chloride Level 99 MEQ/L


 


Carbon Dioxide Level 30.5 MEQ/L


 


Anion Gap 8 MEQ/L


 


Blood Urea Nitrogen 26 MG/DL


 


Creatinine 1.31 MG/DL


 


Estimat Glomerular Filtration 66 ML/MIN





Rate 


 


Random Glucose 149 MG/DL


 


Calcium Level 8.9 MG/DL


 


B-Type Natriuretic Peptide 8 PG/ML














MDM


Medical Decision Making


Medical Screen Exam Complete:  Yes


Emergency Medical Condition:  Yes


Interpretation(s)


Tachycardic, hypoxic, 90 or 91% on 2 L


Leukocytosis with left shift


Renal insufficiency


BNP is normal





Last 24 hours Impressions








Chest X-Ray 1/26/17 0542 Signed





Impressions: 





 Service Date/Time:  Thursday, January 26, 2017 06:15 - CONCLUSION:  1. Mild 

left 





 lower lung atelectasis. 2. Otherwise stable examination.     Fausto Linda MD 








Differential Diagnosis


COPD exacerbation, pneumonia, pulmonary embolism, pleural effusion and 

congestive heart failure


Narrative Course


This is a 66-year-old male with a history of COPD who presents to the emergency 

department with persistent cough and shortness of breath.  He was just 

discharged from the hospital with a COPD exacerbation.  He says he felt well 

when he went home yesterday but over the past 24 hours he's been worsening.  He 

was placed on a monitor and an IV was established.  He is found to have a 

leukocytosis which is likely in the setting of steroids.  Labs are reassuring 

with a normal BNP.  Chest x-ray is similar to prior.  Patient was given serial 

bronchodilator treatments and a dose of IV Solu-Medrol.  I had along 

conversation with the patient and his wife.  We would all like to avoid another 

hospital admission.  I think it's reasonable for the patient to increase his 

oxygen at home currently to 3-4 L from 2 L until his symptoms improve.  They 

would like to try a cough medicine which I think will help and I will try to 

arrange for home health care for the patient.  They will have a low threshold 

to come back if his symptoms worsen.





Diagnosis





 Primary Impression:  


 COPD exacerbation


Patient Instructions:  General Instructions





***Additional Instructions:


If you develop severe shortness of breath, chest pain, or difficulty breathing 

return to the emergency department.





Use albuterol every 4 hours for the next 2 days.  Then use as needed for 

wheezing.


Complete your course of steroids.


Complete your course of antibiotics.





Follow up with your primary care physician in 2-3 days if your symptoms have 

not improved.


***Med/Other Pt SpecificInfo:  No Change to Meds


Scripts


Promethazine-Codeine Liq 6.25-10 Mg/5 Ml Syrp5 Ml PO Q4H PRN (COUGH AND/OR COLD 

SYMPTOMS) #120 ML


   Prov:Ebony Gusman MD         1/26/17


Disposition:  01 DISCHARGE HOME


Condition:  Stable








Ebony Gusman MD Jan 26, 2017 05:51

## 2017-06-28 NOTE — RADRPT
EXAM DATE/TIME:  01/26/2017 06:15 

 

[facility] COMPARISON:     

CT PULMONARY ANGIOGRAM, January 22, 2017, 20:48.  CHEST SINGLE AP, January 22, 2017, 18:05.

 

                     

INDICATIONS :     

Shortness of breath.

                     

 

MEDICAL HISTORY :     

Chronic obstructive pulmonary disease.          

 

SURGICAL HISTORY :     

None.   

 

ENCOUNTER:     

Initial                                        

 

ACUITY:     

1 day      

 

PAIN SCORE:     

1/10

 

LOCATION:     

Bilateral chest 

 

FINDINGS:     

A single view of the chest demonstrates mild atelectasis in the left lung base. Otherwise, the lungs 
remain grossly clear. No new or significant changes seen compared to the prior study. Heart size is s
table. There are no pleural effusions. No pulmonary edema. The bony structures are stable.

 

CONCLUSION:     

1. Mild left lower lung atelectasis.

2. Otherwise stable examination.

 

 

 

 Fausto Linda MD on January 26, 2017 at 6:30           

Board Certified Radiologist.

 This report was verified electronically. The current medical regimen is effective;  continue present plan and medications. Counseled on low sodium diet and information provided on his AVS

## 2017-09-18 ENCOUNTER — HOSPITAL ENCOUNTER (EMERGENCY)
Dept: HOSPITAL 17 - NEPE | Age: 67
Discharge: HOME | End: 2017-09-18
Payer: OTHER GOVERNMENT

## 2017-09-18 VITALS — OXYGEN SATURATION: 98 %

## 2017-09-18 VITALS
SYSTOLIC BLOOD PRESSURE: 142 MMHG | OXYGEN SATURATION: 97 % | TEMPERATURE: 98.4 F | RESPIRATION RATE: 20 BRPM | HEART RATE: 94 BPM | DIASTOLIC BLOOD PRESSURE: 95 MMHG

## 2017-09-18 VITALS — WEIGHT: 152.12 LBS | HEIGHT: 69 IN | BODY MASS INDEX: 22.53 KG/M2

## 2017-09-18 DIAGNOSIS — R51: ICD-10-CM

## 2017-09-18 DIAGNOSIS — M54.5: ICD-10-CM

## 2017-09-18 DIAGNOSIS — Z79.84: ICD-10-CM

## 2017-09-18 DIAGNOSIS — R61: ICD-10-CM

## 2017-09-18 DIAGNOSIS — E11.9: ICD-10-CM

## 2017-09-18 DIAGNOSIS — R94.31: ICD-10-CM

## 2017-09-18 DIAGNOSIS — J44.1: Primary | ICD-10-CM

## 2017-09-18 DIAGNOSIS — R60.0: ICD-10-CM

## 2017-09-18 DIAGNOSIS — Z87.09: ICD-10-CM

## 2017-09-18 LAB
ALP SERPL-CCNC: 58 U/L (ref 45–117)
ALT SERPL-CCNC: 26 U/L (ref 12–78)
ANION GAP SERPL CALC-SCNC: 8 MEQ/L (ref 5–15)
AST SERPL-CCNC: 16 U/L (ref 15–37)
BILIRUB SERPL-MCNC: 0.9 MG/DL (ref 0.2–1)
BUN SERPL-MCNC: 15 MG/DL (ref 7–18)
CHLORIDE SERPL-SCNC: 100 MEQ/L (ref 98–107)
GFR SERPLBLD BASED ON 1.73 SQ M-ARVRAT: 88 ML/MIN (ref 89–?)
HCO3 BLD-SCNC: 28.5 MEQ/L (ref 21–32)
POTASSIUM SERPL-SCNC: 3.3 MEQ/L (ref 3.5–5.1)
SODIUM SERPL-SCNC: 136 MEQ/L (ref 136–145)

## 2017-09-18 PROCEDURE — 80053 COMPREHEN METABOLIC PANEL: CPT

## 2017-09-18 PROCEDURE — 93005 ELECTROCARDIOGRAM TRACING: CPT

## 2017-09-18 PROCEDURE — 99285 EMERGENCY DEPT VISIT HI MDM: CPT

## 2017-09-18 PROCEDURE — 71020: CPT

## 2017-09-18 PROCEDURE — 83880 ASSAY OF NATRIURETIC PEPTIDE: CPT

## 2017-09-18 PROCEDURE — 94640 AIRWAY INHALATION TREATMENT: CPT

## 2017-09-18 PROCEDURE — 96375 TX/PRO/DX INJ NEW DRUG ADDON: CPT

## 2017-09-18 PROCEDURE — 94664 DEMO&/EVAL PT USE INHALER: CPT

## 2017-09-18 PROCEDURE — 96374 THER/PROPH/DIAG INJ IV PUSH: CPT

## 2017-09-18 RX ADMIN — IPRATROPIUM BROMIDE AND ALBUTEROL SULFATE SCH AMPULE: .5; 3 SOLUTION RESPIRATORY (INHALATION) at 10:04

## 2017-09-18 NOTE — PD
Data


Data


Last Documented VS





Vital Signs








  Date Time  Temp Pulse Resp B/P (MAP) Pulse Ox O2 Delivery O2 Flow Rate FiO2


 


9/18/17 10:05     98   21


 


9/18/17 09:22      Room Air  


 


9/18/17 09:22     (111)    


 


9/18/17 08:52  85      


 


9/18/17 08:23 98.4  20     








Orders





 Orders


Electrocardiogram (9/18/17 )


Comprehensive Metabolic Panel (9/18/17 09:17)


Iv Access Insert/Monitor (9/18/17 09:17)


Oximetry (9/18/17 09:17)


Oxygen Administration (9/18/17 09:17)


Methylprednisolone So Succ Inj (Solumedr (9/18/17 09:30)


Albuterol Neb (Albuterol Neb) (9/18/17 09:30)


Albuterol-Ipratropium Neb (Duoneb Neb) (9/18/17 09:30)


Sodium Chloride 0.9% Flush (Ns Flush) (9/18/17 09:30)


B-Type Natriuretic Peptide (9/18/17 09:17)


Chest, Pa & Lat (9/18/17 09:54)





Labs





Laboratory Tests








Test


  9/18/17


09:30


 


Blood Urea Nitrogen 15 MG/DL 


 


Creatinine 1.02 MG/DL 


 


Random Glucose 174 MG/DL 


 


Total Protein 6.6 GM/DL 


 


Albumin 3.0 GM/DL 


 


Calcium Level 8.9 MG/DL 


 


Alkaline Phosphatase 58 U/L 


 


Aspartate Amino Transf


(AST/SGOT) 16 U/L 


 


 


Alanine Aminotransferase


(ALT/SGPT) 26 U/L 


 


 


Total Bilirubin 0.9 MG/DL 


 


Sodium Level 136 MEQ/L 


 


Potassium Level 3.3 MEQ/L 


 


Chloride Level 100 MEQ/L 


 


Carbon Dioxide Level 28.5 MEQ/L 


 


Anion Gap 8 MEQ/L 


 


Estimat Glomerular Filtration


Rate 88 ML/MIN 


 


 


B-Type Natriuretic Peptide 22 PG/ML 











MDM


Supervised Visit with WESLEY:  No


Narrative Course


The history, exam, and medical decision-making in the associated Resident 

provider note were completed with my assistance. I reviewed and agree with the 

findings presented.  I attest that I had a face-to-face encounter with the 

patient on the same day, and personally performed and documented my assessment 

and findings in the medical record. 





*My assessment and Findings: This is a 66-year-old male who has a history of 

COPD who presents to the emergency department with increasing shortness of 

breath.  He takes 10 mg of prednisone daily at baseline.  He has a dry cough.  

Chest x-ray was reassuring.  Electrolytes are reassuring and BMP is normal.  

Patient appears very well on reassessment.  He received serial bronchodilators 

and steroids.  I think he would benefit from an increased dose of prednisone 

for the next couple of days.  He also is having some back pain.  Patient will 

be prescribed anti-inflammatories.  He will be discharged home.


Diagnosis





 Primary Impression:  


 COPD exacerbation


Patient Instructions:  General Instructions





***Additional Instruction:  


If you develop severe shortness of breath, chest pain, or difficulty breathing 

return to the emergency department.





Use albuterol every 4 hours for the next 2 days.  Then use as needed for 

wheezing.


Complete your course of steroids.





Follow up with your primary care physician in 2-3 days if your symptoms have 

not improved.


***Med/Other Pt SpecificInfo:  Prescription(s) given


Scripts


Naproxen (Naproxen) 375 Mg Tab


375 MG PO BID Y for PAIN SCALE 4 TO 10, #20 TAB 0 Refills


   Prov: Ebony Gusman MD         9/18/17 


Prednisone (Prednisone) 20 Mg Tab


40 MG PO DAILY for 4 Days, TAB 0 Refills


   Take 40 mg (2 tablets) daily for 5 days


   Prov: Ebony Gusman MD         9/18/17


Disposition:  01 DISCHARGE HOME


Condition:  Stable











Ebony Gusman MD Sep 18, 2017 11:17

## 2017-09-18 NOTE — PD
HPI


Chief Complaint:  Respiratory Symptoms


Time Seen by Provider:  09:30


Travel History


International Travel<30 days:  No


Contact w/Intl Traveler<30days:  No


Traveled to known affect area:  No





History of Present Illness


HPI


66 yr old M w/ PMHx of COPD and T2DM presents to the ED with 1 week hx of 

worsening SOB, wheezing, and coughing. Endorses more productive cough of white 

sputum and more wheezing at night. SOB worse with lifting, bending, and lying 

down. Better with sitting. Has tried albuterol neb treatments at home w/o 

improvement. Last hospitalization for COPD exacerbations was January of this 

year. Also complains of chronic lower back pain due to herniated discs. He 

would like a prescription for his back pain.  Endorses HA, chronic night sweats

, and chronic leg edema. Denies sick contacts, CP, fevers, N/V, and abdominal 

pain.





History


Social History


Alcohol Use:  No


Tobacco Use:  No





Allergies-Medications


(Allergen,Severity, Reaction):  


Coded Allergies:  


     clindamycin (Unverified  Allergy, Mild, RASH, 9/18/17)


     tobramycin (Unverified  Allergy, Mild, RASH, 9/18/17)


Reported Meds & Prescriptions





Reported Meds & Active Scripts


Active


Naproxen 375 Mg Tab 375 Mg PO BID PRN


Prednisone 20 Mg Tab 40 Mg PO DAILY 4 Days


     Take 40 mg (2 tablets) daily for 5 days


Fluticasone Nasal Spray 50 Mcg/Act Naspr 2 [place] NASAL DAILY


Reported


Furosemide 20 Mg Tab 20 Mg PO DAILY


Spiriva Handihaler (Tiotropium Inh) 18 Mcg Cap 18 Mcg INH DAILY


     1 capsule = 18 mcg


Atorvastatin (Atorvastatin Calcium) 10 Mg Tab 10 Mg PO HS


Metformin ER (Metformin HCl) 500 Mg Fredi 500 Mg PO TID


     With evening meal


Nateglinide 120 Mg Tab 120 Mg PO TIDAC


Albuterol Neb (Albuterol Sulfate) 1.25 Mg/3 Ml Neb 1.25 Mg NEB TID NEB PRN


Prednisone 10 Mg Tab 10 Mg PO DAILY








Review of Systems


Except as stated in HPI:  all other systems reviewed are Neg





Physical Exam


Narrative


GENERAL: Well-nourished, well-developed patient, lying in bed, in NAD


SKIN: Warm and dry.


HEAD: Normocephalic.


EYES: No scleral icterus. No injection or drainage. 


NECK: Supple, trachea midline. No JVD or lymphadenopathy.


CARDIOVASCULAR: Regular rate and rhythm without murmurs, gallops, or rubs. 


RESPIRATORY: wheezes in lower lung bases. no crackles. 


GASTROINTESTINAL: Abdomen soft, non-tender, nondistended. + BS 


EXTREMITIES: 2+ pitting edema in lower extremities 


NEUROLOGICAL: Awake, alert, and oriented x 3. Non-focal.





Data


Data


Last Documented VS





Vital Signs








  Date Time  Temp Pulse Resp B/P (MAP) Pulse Ox O2 Delivery O2 Flow Rate FiO2


 


9/18/17 11:25        


 


9/18/17 10:05     98   21


 


9/18/17 09:22      Room Air  


 


9/18/17 08:52  85      


 


9/18/17 08:23 98.4  20     








Orders





 Orders


Electrocardiogram (9/18/17 )


Comprehensive Metabolic Panel (9/18/17 09:17)


Iv Access Insert/Monitor (9/18/17 09:17)


Oximetry (9/18/17 09:17)


Oxygen Administration (9/18/17 09:17)


Methylprednisolone So Succ Inj (Solumedr (9/18/17 09:30)


Albuterol Neb (Albuterol Neb) (9/18/17 09:30)


Albuterol-Ipratropium Neb (Duoneb Neb) (9/18/17 09:30)


Sodium Chloride 0.9% Flush (Ns Flush) (9/18/17 09:30)


B-Type Natriuretic Peptide (9/18/17 09:17)


Chest, Pa & Lat (9/18/17 09:54)


Ketorolac Inj (Toradol Inj) (9/18/17 11:15)





Labs





Laboratory Tests








Test


  9/18/17


09:30


 


Blood Urea Nitrogen 15 MG/DL 


 


Creatinine 1.02 MG/DL 


 


Random Glucose 174 MG/DL 


 


Total Protein 6.6 GM/DL 


 


Albumin 3.0 GM/DL 


 


Calcium Level 8.9 MG/DL 


 


Alkaline Phosphatase 58 U/L 


 


Aspartate Amino Transf


(AST/SGOT) 16 U/L 


 


 


Alanine Aminotransferase


(ALT/SGPT) 26 U/L 


 


 


Total Bilirubin 0.9 MG/DL 


 


Sodium Level 136 MEQ/L 


 


Potassium Level 3.3 MEQ/L 


 


Chloride Level 100 MEQ/L 


 


Carbon Dioxide Level 28.5 MEQ/L 


 


Anion Gap 8 MEQ/L 


 


Estimat Glomerular Filtration


Rate 88 ML/MIN 


 


 


B-Type Natriuretic Peptide 22 PG/ML 











MDM


Medical Decision Making


Medical Screen Exam Complete:  Yes


Emergency Medical Condition:  No


Differential Diagnosis


COPD exacerbation vs Pneumonia vs MI vs Pulmonary Embolism


Narrative Course


CXR negative. Electrolytes wnl. Patient did not required supplemental oxygen. 

Patient improved in the ED with steroids, albuterol nebs, and Duonebs. Patient 

determined stable and discharge with course of steroids and NSAID for back pain.


Scripts


Naproxen (Naproxen) 375 Mg Tab


375 MG PO BID Y for PAIN SCALE 4 TO 10, #20 TAB 0 Refills


   Prov: Ebony Gusman MD         9/18/17 


Prednisone (Prednisone) 20 Mg Tab


40 MG PO DAILY for 4 Days, TAB 0 Refills


   Take 40 mg (2 tablets) daily for 5 days


   Prov: Ebony Gusman MD         9/18/17











Jhoana Chandler MD R1 Sep 18, 2017 09:30

## 2017-09-18 NOTE — RADRPT
EXAM DATE/TIME:  09/18/2017 09:59 

 

[facility] COMPARISON:     

CHEST PA & LAT, March 17, 2014, 9:09.

 

                     

INDICATIONS :     

Shortness of breath.

                     

 

MEDICAL HISTORY :     

Chronic obstructive pulmonary disease.  Diabetes mellitus type II.        

 

SURGICAL HISTORY :     

None.   

 

ENCOUNTER:     

Initial                                        

 

ACUITY:     

1 week      

 

PAIN SCORE:     

0/10

 

LOCATION:     

Bilateral chest 

 

FINDINGS:     

PA and lateral views of the chest demonstrate a normal-sized cardiac silhouette. Lungs are underinfla
rene with atelectasis at the bases. No pleural effusion, airspace consolidation, or pneumothorax is vi
sualized. Bones and soft tissues demonstrate no acute finding. There is mild thoracolumbar scoliosis.


 

CONCLUSION:     

No acute cardiopulmonary abnormality is identified.

 

 

 

 Clinton Gutierrez MD on September 18, 2017 at 10:01           

Board Certified Radiologist.

 This report was verified electronically.

## 2017-09-18 NOTE — EKG
Date Performed: 09/18/2017       Time Performed: 09:00:56

 

PTAGE:      66 years

 

EKG:      Sinus rhythm 

 

 BORDERLINE LEFT AXIS DEVIATION BORDERLINE ECG

 

PREVIOUS TRACING       : 01/26/2017 05.55 No significant change from previous tracing noted.

 

DOCTOR:   Hayden Loredo  Interpretating Date/Time  09/18/2017 14:15:07

## 2018-06-19 ENCOUNTER — HOSPITAL ENCOUNTER (EMERGENCY)
Dept: HOSPITAL 17 - NEPD | Age: 68
Discharge: HOME | End: 2018-06-19
Payer: OTHER GOVERNMENT

## 2018-06-19 VITALS
DIASTOLIC BLOOD PRESSURE: 85 MMHG | HEART RATE: 77 BPM | SYSTOLIC BLOOD PRESSURE: 155 MMHG | OXYGEN SATURATION: 98 % | RESPIRATION RATE: 18 BRPM

## 2018-06-19 VITALS — HEIGHT: 68 IN | WEIGHT: 154.32 LBS | BODY MASS INDEX: 23.39 KG/M2

## 2018-06-19 VITALS
HEART RATE: 89 BPM | TEMPERATURE: 97.9 F | DIASTOLIC BLOOD PRESSURE: 90 MMHG | OXYGEN SATURATION: 100 % | RESPIRATION RATE: 16 BRPM | SYSTOLIC BLOOD PRESSURE: 158 MMHG

## 2018-06-19 DIAGNOSIS — Z79.899: ICD-10-CM

## 2018-06-19 DIAGNOSIS — Z79.51: ICD-10-CM

## 2018-06-19 DIAGNOSIS — I10: ICD-10-CM

## 2018-06-19 DIAGNOSIS — Z88.8: ICD-10-CM

## 2018-06-19 DIAGNOSIS — E11.9: ICD-10-CM

## 2018-06-19 DIAGNOSIS — J44.9: ICD-10-CM

## 2018-06-19 DIAGNOSIS — E78.00: ICD-10-CM

## 2018-06-19 DIAGNOSIS — Z79.82: ICD-10-CM

## 2018-06-19 DIAGNOSIS — M25.512: ICD-10-CM

## 2018-06-19 DIAGNOSIS — M54.12: Primary | ICD-10-CM

## 2018-06-19 PROCEDURE — 96372 THER/PROPH/DIAG INJ SC/IM: CPT

## 2018-06-19 PROCEDURE — 99283 EMERGENCY DEPT VISIT LOW MDM: CPT

## 2018-06-19 NOTE — PD
HPI


Chief Complaint:  Pain: Acute or Chronic


Time Seen by Provider:  08:21


Travel History


International Travel<30 days:  No


Contact w/Intl Traveler<30days:  No


Traveled to known affect area:  No





History of Present Illness


HPI


67-year-old male presents to the emergency room with complaint of left-sided 

neck pain and shoulder pain that radiates down his arm for the past 3 weeks and 

worsening for the past 2 days.  Says he sleeps on his left side and thinks it 

is related to how he sleeps.  Denies chest pain or shortness of breath.  Denies 

dropping.  Denies fever, vomiting.  Denies paresthesias, loss of sensation, 

decreased range of motion, decreased strength to the left upper extremity.  

Pain is worse with movement of the shoulder and the neck.  Better at rest.  

Rates pain 7/10.  Has tried BenGay, ibuprofen, and heating pad with minimal 

relief of symptoms.  Primary care provider is Dr. Cortez.  History of diabetes 

type 2, COPD. allergies to tobramycin and clindamycin.  Has no other medical 

complaints.  No other modifying factors or associated signs and symptoms.





PFSH


Past Medical History


Arthritis:  No


Cancer:  Yes (PROSTATE)


Cardiovascular Problems:  Yes


High Cholesterol:  Yes


COPD:  Yes (EMPHYSEMA COPD)


Cerebrovascular Accident:  No


Diabetes:  Yes


Patient Takes Glucophage:  Yes


Diminished Hearing:  No


Genitourinary:  Yes


Hypertension:  Yes


Immune Disorder:  No


Musculoskeletal:  Yes


Neurologic:  No


Psychiatric:  No


Reproductive:  No


Respiratory:  Yes (copd)


Immunizations Current:  Yes


Migraines:  No


Radiation Therapy:  Yes (7-8 years ago)


Seizures:  No


Tetanus Vaccination:  < 5 Years





Past Surgical History


Abdominal Surgery:  Yes


Other Surgery:  Yes (4 SINUS SURGERIES - 2000 - 2007)





Social History


Alcohol Use:  No


Tobacco Use:  No


Substance Use:  No





Allergies-Medications


(Allergen,Severity, Reaction):  


Coded Allergies:  


     clindamycin (Unverified  Allergy, Mild, RASH, 6/19/18)


     tobramycin (Unverified  Allergy, Mild, RASH, 6/19/18)


Reported Meds & Prescriptions





Reported Meds & Active Scripts


Active


Robaxin (Methocarbamol) 500 Mg Tab 500 Mg PO QID PRN


Naproxen 375 Mg Tab 375 Mg PO BID PRN


Fluticasone Nasal Spray 50 Mcg/Act Naspr 2 [place] NASAL DAILY


Reported


Symbicort Inh (Budesonide/Formoterol Fumarate) 160-4.5 Mcg/Act Aero 2 Puff INH 

Q12HR


Spiriva Handihaler (Tiotropium Inh) 18 Mcg Cap 18 Mcg INH DAILY


     1 capsule = 18 mcg


Aspirin 81 Mg Chew 81 Mg CHEW DAILY


Furosemide 20 Mg Tab 20 Mg PO DAILY


Spiriva Handihaler (Tiotropium Inh) 18 Mcg Cap 18 Mcg INH DAILY


     1 capsule = 18 mcg


Atorvastatin (Atorvastatin Calcium) 10 Mg Tab 10 Mg PO HS


Metformin ER (Metformin HCl) 500 Mg Fredi 500 Mg PO TID


     With evening meal


Nateglinide 120 Mg Tab 120 Mg PO TIDAC


Albuterol Neb (Albuterol Sulfate) 1.25 Mg/3 Ml Neb 1.25 Mg NEB TID NEB PRN


Prednisone 10 Mg Tab 10 Mg PO DAILY








Review of Systems


Except as stated in HPI:  all other systems reviewed are Neg





Physical Exam


Narrative


GENERAL: Well-nourished, well-developed elderly, black male patient, in no 

acute distress; afebrile, nontoxic-appearing


SKIN: Warm and dry.  No rash noted to the left upper back, left neck, left 

shoulder area, left arm.


HEAD: Atraumatic. Normocephalic. 


EYES: Pupils equal and round. No scleral icterus. No injection or drainage.


ENT: Mucosa pink and moist.  Airway patent.


NECK: Trachea midline.  No lymphadenopathy.  Active rotation of the neck 

greater than 45 left and right.  No midline point tenderness on palpation of 

the cervical spine.  Reproducible tenderness to the left lateral trapezius 

musculature of the neck and down to the upper back/shoulder area.  No obvious 

deformities.  


CARDIOVASCULAR: Regular rate and rhythm.  No murmur appreciated.  


RESPIRATORY: No accessory muscle use. Clear to auscultation. Breath sounds 

equal bilaterally. 


GASTROINTESTINAL: Abdomen soft, non-tender, nondistended. Hepatic and splenic 

margins not palpable.  Bowel sounds are active 4 quadrants.  


MUSCULOSKELETAL: Left shoulder with full range of motion and greater than 90 

abduction.  Left upper extremity is supple nontender with 2+ radial pulse and 

sensory intact without erythema or edema; equal  strength bilaterally.   No 

obvious deformities. No clubbing.  No cyanosis.  No edema.  Normal gait.  


BACK: No point tenderness on palpation of thoracic spine.  No obvious 

deformities.


NEUROLOGICAL: Awake and alert.  Oriented 3.  No obvious cranial nerve 

deficits.  Motor grossly within normal limits. Normal speech.  Moves all 

extremities.  5/5 strength to all extremities.  Sensory intact.


PSYCHIATRIC: Appropriate mood and affect; insight and judgment normal.





Data


Data


Last Documented VS





Vital Signs








  Date Time  Temp Pulse Resp B/P (MAP) Pulse Ox O2 Delivery O2 Flow Rate FiO2


 


6/19/18 09:22  77 18 155/85 (108) 98   


 


6/19/18 09:22      Room Air  


 


6/19/18 07:45 97.9       








Orders





 Orders


Orphenadrine Inj (Norflex Inj) (6/19/18 09:15)


Ed Discharge Order (6/19/18 09:12)








[facility]


Medical Decision Making


Medical Screen Exam Complete:  Yes


Emergency Medical Condition:  Yes


Medical Record Reviewed:  Yes


Differential Diagnosis


Cervical radiculopathy, trapezius muscle spasm, muscle strain


Narrative Course


67-year-old male with left-sided cervical radiculopathy.  Physical exam and HPI 

are consistent with musculoskeletal pain.  He has no chest pain or shortness of 

breath.  I discussed my findings with Dr. Trent and he agrees with my plan 

of care and patient disposition.  Norflex administered in the ER.  Patient took 

ibuprofen prior to arrival.  No midline tenderness on palpation of the cervical 

spine.  Robaxin prescribed for home.  Instructed patient to follow up with 

primary care provider.  Patient verbalizes understanding and agreement with 

treatment plan.  Patient is medically cleared and stable for discharge.  

Discussed reasons to return to the emergency department.  Patient agrees with 

treatment plan.  The patients vital signs are stable and the patient is stable 

for outpatient follow-up and treatment.  Patient discharged home, stable and in 

no acute distress.





Diagnosis





 Primary Impression:  


 Cervical radiculopathy


Referrals:  


Primary Care Physician


Patient Instructions:  Cervical Radiculopathy (ED), General Instructions, 

Muscle Spasm (ED)





***Additional Instructions:  


Tylenol or ibuprofen as directed and as needed for pain


Robaxin as prescribed and as needed for muscle spasms


Heating pad and/or ice to affected area to reduce pain


Avoid aggravating activities; increase activity as tolerated


Follow-up with primary care provider


Return to emergency department immediately with worsening of symptoms


***Med/Other Pt SpecificInfo:  Prescription(s) given


Scripts


Methocarbamol (Robaxin) 500 Mg Tab


500 MG PO QID Y for MUSCLE SPASM, #30 TAB 0 Refills


   Prov: Verna Spain         6/19/18


Disposition:  01 DISCHARGE HOME


Condition:  Stable











Verna Spain Jun 19, 2018 09:09

## 2018-06-20 ENCOUNTER — HOSPITAL ENCOUNTER (EMERGENCY)
Dept: HOSPITAL 17 - NEPD | Age: 68
Discharge: HOME | End: 2018-06-20
Payer: OTHER GOVERNMENT

## 2018-06-20 VITALS — BODY MASS INDEX: 22.86 KG/M2 | WEIGHT: 154.32 LBS | HEIGHT: 69 IN

## 2018-06-20 VITALS
OXYGEN SATURATION: 98 % | HEART RATE: 90 BPM | TEMPERATURE: 98.3 F | DIASTOLIC BLOOD PRESSURE: 105 MMHG | SYSTOLIC BLOOD PRESSURE: 179 MMHG | RESPIRATION RATE: 16 BRPM

## 2018-06-20 DIAGNOSIS — M43.6: Primary | ICD-10-CM

## 2018-06-20 DIAGNOSIS — Z88.1: ICD-10-CM

## 2018-06-20 DIAGNOSIS — I10: ICD-10-CM

## 2018-06-20 DIAGNOSIS — E78.00: ICD-10-CM

## 2018-06-20 DIAGNOSIS — E11.9: ICD-10-CM

## 2018-06-20 DIAGNOSIS — Z79.84: ICD-10-CM

## 2018-06-20 DIAGNOSIS — J44.9: ICD-10-CM

## 2018-06-20 DIAGNOSIS — Z85.46: ICD-10-CM

## 2018-06-20 DIAGNOSIS — Z79.899: ICD-10-CM

## 2018-06-20 DIAGNOSIS — M54.10: ICD-10-CM

## 2018-06-20 PROCEDURE — 99283 EMERGENCY DEPT VISIT LOW MDM: CPT

## 2018-06-20 PROCEDURE — 96372 THER/PROPH/DIAG INJ SC/IM: CPT

## 2018-06-20 NOTE — PD
HPI


Chief Complaint:  Pain: Acute or Chronic


Time Seen by Provider:  08:10


Travel History


International Travel<30 days:  No


Contact w/Intl Traveler<30days:  No


Traveled to known affect area:  No





History of Present Illness


HPI


67-year-old -American male presents emergency department with ongoing 

left side shoulder and neck pain with left arm radicular pain for the past 3 

weeks.  Patient was seen yesterday after worsening symptoms over the past 3 

days.  He states he was given Norflex, and ibuprofen, as well as a prescription 

for Robaxin which she has been unable to fill, as the pharmacies locally are 

out of it.  Patient returns with ongoing pain and stiffness as well as 

radicular symptoms in the left arm.  Patient states he took 40 mg of prednisone 

last evening, which he has for his COPD, which she felt improved his symptoms 

somewhat.  Patient states pain is approximately the same as it was yesterday.  

He feels he is somewhat weak in the left arm secondary to his symptoms.  Pain 

is currently 9 out of 10.  It is worse with movement.  He is allergic to 

clindamycin, and tobramycin.





PFSH


Past Medical History


Arthritis:  No


Cancer:  Yes (PROSTATE)


Cardiovascular Problems:  Yes


High Cholesterol:  Yes


COPD:  Yes (EMPHYSEMA COPD)


Cerebrovascular Accident:  No


Diabetes:  Yes


Diminished Hearing:  No


Genitourinary:  Yes


Hypertension:  Yes


Immune Disorder:  No


Musculoskeletal:  Yes


Neurologic:  No


Psychiatric:  No


Reproductive:  No


Respiratory:  Yes (copd)


Immunizations Current:  Yes


Migraines:  No


Radiation Therapy:  Yes (7-8 years ago)


Seizures:  No





Past Surgical History


Abdominal Surgery:  Yes


Other Surgery:  Yes (4 SINUS SURGERIES - 2000 - 2007)





Social History


Alcohol Use:  No


Tobacco Use:  No


Substance Use:  No





Allergies-Medications


(Allergen,Severity, Reaction):  


Coded Allergies:  


     clindamycin (Unverified  Allergy, Mild, RASH, 6/19/18)


     tobramycin (Unverified  Allergy, Mild, RASH, 6/19/18)


Reported Meds & Prescriptions





Reported Meds & Active Scripts


Active


Robaxin (Methocarbamol) 500 Mg Tab 500 Mg PO QID PRN


Naproxen 375 Mg Tab 375 Mg PO BID PRN


Fluticasone Nasal Spray 50 Mcg/Act Naspr 2 [place] NASAL DAILY


Reported


Symbicort Inh (Budesonide/Formoterol Fumarate) 160-4.5 Mcg/Act Aero 2 Puff INH 

Q12HR


Spiriva Handihaler (Tiotropium Inh) 18 Mcg Cap 18 Mcg INH DAILY


     1 capsule = 18 mcg


Aspirin 81 Mg Chew 81 Mg CHEW DAILY


Furosemide 20 Mg Tab 20 Mg PO DAILY


Spiriva Handihaler (Tiotropium Inh) 18 Mcg Cap 18 Mcg INH DAILY


     1 capsule = 18 mcg


Atorvastatin (Atorvastatin Calcium) 10 Mg Tab 10 Mg PO HS


Metformin ER (Metformin HCl) 500 Mg Fredi 500 Mg PO TID


     With evening meal


Nateglinide 120 Mg Tab 120 Mg PO TIDAC


Albuterol Neb (Albuterol Sulfate) 1.25 Mg/3 Ml Neb 1.25 Mg NEB TID NEB PRN


Prednisone 10 Mg Tab 10 Mg PO DAILY








Review of Systems


Except as stated in HPI:  all other systems reviewed are Neg


General / Constitutional:  No: Fever


Eyes:  No: Visual changes


HENT:  No: Headaches


Cardiovascular:  No: Chest Pain or Discomfort


Respiratory:  No: Shortness of Breath


Gastrointestinal:  No: Abdominal Pain


Genitourinary:  No: Dysuria


Musculoskeletal:  Positive: Myalgias, Limited ROM, Pain


Skin:  No Rash


Neurologic:  Positive: Paresthesia, No: Weakness


Psychiatric:  No: Depression


Endocrine:  No: Polydipsia


Hematologic/Lymphatic:  No: Easy Bruising





Physical Exam


Narrative


GENERAL: Patient appears in mild to moderate distress.


SKIN: Warm and dry.  Normal color.  Normal turgor.  No rash.


HEAD: Atraumatic. Normocephalic. 


EYES: Pupils equal and round. No scleral icterus. No injection or drainage. 


ENT: No nasal bleeding or discharge.  Mucous membranes pink and moist.  Pharynx 

is clear.  Airways patent.


NECK: Trachea midline.  Patient has no bony tenderness or step-off.  Range of 

motion is full without significant increase in pain.  Patient has obvious 

muscle spasms in the left upper trapezius and subscapularis.


CARDIOVASCULAR: Regular rate and rhythm.  


RESPIRATORY: No accessory muscle use. Clear to auscultation. Breath sounds 

equal bilaterally. 


GASTROINTESTINAL: Abdomen soft, non-tender, nondistended. Hepatic and splenic 

margins not palpable. 


MUSCULOSKELETAL: Extremities without clubbing, cyanosis, or edema. No obvious 

deformities.  Patient is reproducible pain along the left shoulder and trapezius

, with muscle spasms appreciated.  Range of motion is intact, normal strength 

is noted.  No obvious paresthesias noted.


NEUROLOGICAL: Awake and alert. No obvious cranial nerve deficits.  Motor 

grossly within normal limits. Five out of 5 muscle strength in the arms and 

legs.  Normal speech.


PSYCHIATRIC: Appropriate mood and affect; insight and judgment normal.





Data


Data


Last Documented VS





Vital Signs








  Date Time  Temp Pulse Resp B/P (MAP) Pulse Ox O2 Delivery O2 Flow Rate FiO2


 


6/20/18 08:04 98.3 90 16 179/105 (129) 98   








Orders





 Orders


Ketorolac Inj (Toradol Inj) (6/20/18 08:30)


Orphenadrine Inj (Norflex Inj) (6/20/18 08:30)


Prednisone (Deltasone) (6/20/18 08:30)








[facility]


Medical Decision Making


Medical Screen Exam Complete:  Yes


Emergency Medical Condition:  Yes


Medical Record Reviewed:  Yes


Differential Diagnosis


Spasmodic torticollis.  Muscle spasm.  Left shoulder pain.  Left radicular pain.


Narrative Course


Radiographic imaging is not felt warranted based on my current physical and 

history.


Patient is given Norflex 60 mg IM.


Patient is given 30 mg Toradol IM.


Patient is given prednisone 40 mg p.o. now.


Patient is not to fill the Robaxin as he cannot get it, and is switched to 

Norflex 100 mg twice daily #20.


Patient is continued on prednisone taper pack as prescribed.


Patient is given a prescription for Lortab 5/325 1 every 6 hours as needed #12.


Patient is to use heat, gentle stretching, and follow-up with primary care if 

symptoms persist or worsen as needed.





Diagnosis





 Primary Impression:  


 Torticollis, spasmodic


 Additional Impression:  


 Radiculopathy affecting upper extremity


Patient Instructions:  General Instructions, Spasmodic Torticollis (ED)





***Additional Instructions:  


Radiographic imaging is not felt warranted based on my current physical and 

history.


Patient is given Norflex 60 mg IM.


Patient is given 30 mg Toradol IM.


Patient is given prednisone 40 mg p.o. now.


Patient is not to fill the Robaxin as he cannot get it, and is switched to 

Norflex 100 mg twice daily #20.


Patient is continued on prednisone taper pack as prescribed.


Patient is given a prescription for Lortab 5/325 1 every 6 hours as needed #12.


Patient is to use heat, gentle stretching, and follow-up with primary care if 

symptoms persist or worsen as needed.


***Med/Other Pt SpecificInfo:  Prescription(s) given


Disposition:  01 DISCHARGE HOME


Condition:  Stable











Logan Mckoy 20, 2018 08:29